# Patient Record
Sex: MALE | Race: BLACK OR AFRICAN AMERICAN | Employment: FULL TIME | URBAN - METROPOLITAN AREA
[De-identification: names, ages, dates, MRNs, and addresses within clinical notes are randomized per-mention and may not be internally consistent; named-entity substitution may affect disease eponyms.]

---

## 2021-08-30 ENCOUNTER — HOSPITAL ENCOUNTER (EMERGENCY)
Age: 56
Discharge: OTHER INSTITUTION | End: 2021-08-31
Attending: EMERGENCY MEDICINE
Payer: COMMERCIAL

## 2021-08-30 ENCOUNTER — APPOINTMENT (OUTPATIENT)
Dept: GENERAL RADIOLOGY | Age: 56
End: 2021-08-30
Attending: EMERGENCY MEDICINE
Payer: COMMERCIAL

## 2021-08-30 DIAGNOSIS — J96.01 ACUTE RESPIRATORY FAILURE WITH HYPOXIA (HCC): Primary | ICD-10-CM

## 2021-08-30 DIAGNOSIS — J12.82 PNEUMONIA DUE TO COVID-19 VIRUS: ICD-10-CM

## 2021-08-30 DIAGNOSIS — U07.1 PNEUMONIA DUE TO COVID-19 VIRUS: ICD-10-CM

## 2021-08-30 LAB
ALBUMIN SERPL-MCNC: 3.3 G/DL (ref 3.5–5)
ALBUMIN/GLOB SERPL: 0.7 {RATIO} (ref 1.1–2.2)
ALP SERPL-CCNC: 51 U/L (ref 45–117)
ALT SERPL-CCNC: 36 U/L (ref 12–78)
ANION GAP SERPL CALC-SCNC: 8 MMOL/L (ref 5–15)
AST SERPL-CCNC: 63 U/L (ref 15–37)
BASOPHILS # BLD: 0 K/UL (ref 0–0.1)
BASOPHILS NFR BLD: 0 % (ref 0–1)
BILIRUB SERPL-MCNC: 0.8 MG/DL (ref 0.2–1)
BUN SERPL-MCNC: 17 MG/DL (ref 6–20)
BUN/CREAT SERPL: 13 (ref 12–20)
CALCIUM SERPL-MCNC: 8.5 MG/DL (ref 8.5–10.1)
CHLORIDE SERPL-SCNC: 94 MMOL/L (ref 97–108)
CO2 SERPL-SCNC: 32 MMOL/L (ref 21–32)
CREAT SERPL-MCNC: 1.26 MG/DL (ref 0.7–1.3)
D DIMER PPP FEU-MCNC: 0.96 MG/L FEU (ref 0–0.65)
DIFFERENTIAL METHOD BLD: ABNORMAL
EOSINOPHIL # BLD: 0 K/UL (ref 0–0.4)
EOSINOPHIL NFR BLD: 0 % (ref 0–7)
ERYTHROCYTE [DISTWIDTH] IN BLOOD BY AUTOMATED COUNT: 11.8 % (ref 11.5–14.5)
FLUAV RNA SPEC QL NAA+PROBE: NOT DETECTED
FLUBV RNA SPEC QL NAA+PROBE: NOT DETECTED
GLOBULIN SER CALC-MCNC: 4.6 G/DL (ref 2–4)
GLUCOSE SERPL-MCNC: 104 MG/DL (ref 65–100)
HCT VFR BLD AUTO: 46.4 % (ref 36.6–50.3)
HGB BLD-MCNC: 15.5 G/DL (ref 12.1–17)
IMM GRANULOCYTES # BLD AUTO: 0 K/UL (ref 0–0.04)
IMM GRANULOCYTES NFR BLD AUTO: 1 % (ref 0–0.5)
INR PPP: 1 (ref 0.9–1.1)
LACTATE SERPL-SCNC: 1.2 MMOL/L (ref 0.4–2)
LDH SERPL L TO P-CCNC: 384 U/L (ref 85–241)
LYMPHOCYTES # BLD: 0.8 K/UL (ref 0.8–3.5)
LYMPHOCYTES NFR BLD: 17 % (ref 12–49)
MCH RBC QN AUTO: 28.5 PG (ref 26–34)
MCHC RBC AUTO-ENTMCNC: 33.4 G/DL (ref 30–36.5)
MCV RBC AUTO: 85.5 FL (ref 80–99)
MONOCYTES # BLD: 0.2 K/UL (ref 0–1)
MONOCYTES NFR BLD: 5 % (ref 5–13)
NEUTS SEG # BLD: 3.7 K/UL (ref 1.8–8)
NEUTS SEG NFR BLD: 77 % (ref 32–75)
NRBC # BLD: 0 K/UL (ref 0–0.01)
NRBC BLD-RTO: 0 PER 100 WBC
PLATELET # BLD AUTO: 190 K/UL (ref 150–400)
PMV BLD AUTO: 9.8 FL (ref 8.9–12.9)
POTASSIUM SERPL-SCNC: 4 MMOL/L (ref 3.5–5.1)
PROT SERPL-MCNC: 7.9 G/DL (ref 6.4–8.2)
PROTHROMBIN TIME: 10.2 SEC (ref 9–11.1)
RBC # BLD AUTO: 5.43 M/UL (ref 4.1–5.7)
RBC MORPH BLD: ABNORMAL
SARS-COV-2, COV2: DETECTED
SODIUM SERPL-SCNC: 134 MMOL/L (ref 136–145)
WBC # BLD AUTO: 4.7 K/UL (ref 4.1–11.1)

## 2021-08-30 PROCEDURE — 74011000250 HC RX REV CODE- 250: Performed by: EMERGENCY MEDICINE

## 2021-08-30 PROCEDURE — 82728 ASSAY OF FERRITIN: CPT

## 2021-08-30 PROCEDURE — 84145 PROCALCITONIN (PCT): CPT

## 2021-08-30 PROCEDURE — 85610 PROTHROMBIN TIME: CPT

## 2021-08-30 PROCEDURE — 86140 C-REACTIVE PROTEIN: CPT

## 2021-08-30 PROCEDURE — 74011250636 HC RX REV CODE- 250/636: Performed by: EMERGENCY MEDICINE

## 2021-08-30 PROCEDURE — 96375 TX/PRO/DX INJ NEW DRUG ADDON: CPT

## 2021-08-30 PROCEDURE — 87040 BLOOD CULTURE FOR BACTERIA: CPT

## 2021-08-30 PROCEDURE — 96366 THER/PROPH/DIAG IV INF ADDON: CPT

## 2021-08-30 PROCEDURE — 36415 COLL VENOUS BLD VENIPUNCTURE: CPT

## 2021-08-30 PROCEDURE — 87636 SARSCOV2 & INF A&B AMP PRB: CPT

## 2021-08-30 PROCEDURE — 74011250637 HC RX REV CODE- 250/637: Performed by: EMERGENCY MEDICINE

## 2021-08-30 PROCEDURE — 96361 HYDRATE IV INFUSION ADD-ON: CPT

## 2021-08-30 PROCEDURE — 83605 ASSAY OF LACTIC ACID: CPT

## 2021-08-30 PROCEDURE — 80053 COMPREHEN METABOLIC PANEL: CPT

## 2021-08-30 PROCEDURE — 99285 EMERGENCY DEPT VISIT HI MDM: CPT

## 2021-08-30 PROCEDURE — 74011000258 HC RX REV CODE- 258: Performed by: EMERGENCY MEDICINE

## 2021-08-30 PROCEDURE — 96365 THER/PROPH/DIAG IV INF INIT: CPT

## 2021-08-30 PROCEDURE — 83615 LACTATE (LD) (LDH) ENZYME: CPT

## 2021-08-30 PROCEDURE — 85025 COMPLETE CBC W/AUTO DIFF WBC: CPT

## 2021-08-30 PROCEDURE — 96374 THER/PROPH/DIAG INJ IV PUSH: CPT

## 2021-08-30 PROCEDURE — 85379 FIBRIN DEGRADATION QUANT: CPT

## 2021-08-30 PROCEDURE — 93005 ELECTROCARDIOGRAM TRACING: CPT

## 2021-08-30 PROCEDURE — 71045 X-RAY EXAM CHEST 1 VIEW: CPT

## 2021-08-30 PROCEDURE — 85384 FIBRINOGEN ACTIVITY: CPT

## 2021-08-30 RX ORDER — SODIUM CHLORIDE 0.9 % (FLUSH) 0.9 %
5-10 SYRINGE (ML) INJECTION AS NEEDED
Status: DISCONTINUED | OUTPATIENT
Start: 2021-08-30 | End: 2021-08-31 | Stop reason: HOSPADM

## 2021-08-30 RX ORDER — ACETAMINOPHEN 500 MG
1000 TABLET ORAL ONCE
Status: COMPLETED | OUTPATIENT
Start: 2021-08-30 | End: 2021-08-30

## 2021-08-30 RX ORDER — KETOROLAC TROMETHAMINE 30 MG/ML
30 INJECTION, SOLUTION INTRAMUSCULAR; INTRAVENOUS
Status: COMPLETED | OUTPATIENT
Start: 2021-08-30 | End: 2021-08-30

## 2021-08-30 RX ADMIN — SODIUM CHLORIDE 1000 ML: 900 INJECTION, SOLUTION INTRAVENOUS at 20:35

## 2021-08-30 RX ADMIN — ACETAMINOPHEN 1000 MG: 500 TABLET ORAL at 20:38

## 2021-08-30 RX ADMIN — SODIUM CHLORIDE 52 ML: 9 INJECTION, SOLUTION INTRAVENOUS at 20:22

## 2021-08-30 RX ADMIN — KETOROLAC TROMETHAMINE 30 MG: 30 INJECTION, SOLUTION INTRAMUSCULAR; INTRAVENOUS at 20:38

## 2021-08-30 RX ADMIN — AZITHROMYCIN 500 MG: 500 INJECTION, POWDER, LYOPHILIZED, FOR SOLUTION INTRAVENOUS at 21:05

## 2021-08-30 RX ADMIN — CEFTRIAXONE SODIUM 1 G: 1 INJECTION, POWDER, FOR SOLUTION INTRAMUSCULAR; INTRAVENOUS at 21:12

## 2021-08-30 RX ADMIN — SODIUM CHLORIDE 1000 ML: 9 INJECTION, SOLUTION INTRAVENOUS at 20:23

## 2021-08-30 NOTE — ED TRIAGE NOTES
Pt arrives reporting COVID-like symptoms x10 days including SOB, cough, fever/chills, fatigue. He is 82% on room air in triage with labored respirations. Placed on 6L via NC and O2 sat increased to 93% on 6L. Denies known exposure.

## 2021-08-31 ENCOUNTER — HOSPITAL ENCOUNTER (INPATIENT)
Age: 56
LOS: 10 days | Discharge: HOME | DRG: 177 | End: 2021-09-10
Attending: EMERGENCY MEDICINE | Admitting: INTERNAL MEDICINE
Payer: COMMERCIAL

## 2021-08-31 VITALS
OXYGEN SATURATION: 90 % | WEIGHT: 200 LBS | HEART RATE: 100 BPM | HEIGHT: 68 IN | BODY MASS INDEX: 30.31 KG/M2 | SYSTOLIC BLOOD PRESSURE: 146 MMHG | RESPIRATION RATE: 41 BRPM | TEMPERATURE: 100.4 F | DIASTOLIC BLOOD PRESSURE: 83 MMHG

## 2021-08-31 DIAGNOSIS — J12.82 PNEUMONIA DUE TO COVID-19 VIRUS: ICD-10-CM

## 2021-08-31 DIAGNOSIS — J96.01 ACUTE RESPIRATORY FAILURE WITH HYPOXIA (HCC): Primary | ICD-10-CM

## 2021-08-31 DIAGNOSIS — U07.1 PNEUMONIA DUE TO COVID-19 VIRUS: ICD-10-CM

## 2021-08-31 LAB
APPEARANCE UR: CLEAR
ARTERIAL PATENCY WRIST A: YES
ATRIAL RATE: 100 BPM
BASE EXCESS BLDA CALC-SCNC: 3.4 MMOL/L
BDY SITE: ABNORMAL
BILIRUB UR QL: NEGATIVE
CALCULATED P AXIS, ECG09: 57 DEGREES
CALCULATED R AXIS, ECG10: 69 DEGREES
CALCULATED T AXIS, ECG11: 29 DEGREES
COLOR UR: NORMAL
CRP SERPL-MCNC: 4.99 MG/DL (ref 0–0.6)
DIAGNOSIS, 93000: NORMAL
FERRITIN SERPL-MCNC: 707 NG/ML (ref 26–388)
FIBRINOGEN PPP-MCNC: 400 MG/DL (ref 200–475)
FIO2 ON VENT: 100 %
GLUCOSE UR STRIP.AUTO-MCNC: NEGATIVE MG/DL
HCO3 BLDA-SCNC: 27 MMOL/L (ref 22–26)
HGB UR QL STRIP: NEGATIVE
KETONES UR QL STRIP.AUTO: NEGATIVE MG/DL
LEUKOCYTE ESTERASE UR QL STRIP.AUTO: NEGATIVE
NITRITE UR QL STRIP.AUTO: NEGATIVE
P-R INTERVAL, ECG05: 152 MS
PCO2 BLDA: 39 MMHG (ref 35–45)
PH BLDA: 7.46 [PH] (ref 7.35–7.45)
PH UR STRIP: 6 [PH] (ref 5–8)
PO2 BLDA: 77 MMHG (ref 80–100)
PROCALCITONIN SERPL-MCNC: 0.14 NG/ML
PROT UR STRIP-MCNC: NEGATIVE MG/DL
Q-T INTERVAL, ECG07: 348 MS
QRS DURATION, ECG06: 76 MS
QTC CALCULATION (BEZET), ECG08: 448 MS
SAO2 % BLD: 96 % (ref 92–97)
SAO2% DEVICE SAO2% SENSOR NAME: ABNORMAL
SP GR UR REFRACTOMETRY: 1.01 (ref 1–1.03)
SPECIMEN SITE: ABNORMAL
UROBILINOGEN UR QL STRIP.AUTO: 1 EU/DL (ref 0.2–1)
VENTRICULAR RATE, ECG03: 100 BPM

## 2021-08-31 PROCEDURE — 65660000000 HC RM CCU STEPDOWN

## 2021-08-31 PROCEDURE — 74011250637 HC RX REV CODE- 250/637: Performed by: EMERGENCY MEDICINE

## 2021-08-31 PROCEDURE — 99285 EMERGENCY DEPT VISIT HI MDM: CPT

## 2021-08-31 PROCEDURE — 74011250636 HC RX REV CODE- 250/636: Performed by: EMERGENCY MEDICINE

## 2021-08-31 PROCEDURE — 77010033678 HC OXYGEN DAILY

## 2021-08-31 PROCEDURE — 82803 BLOOD GASES ANY COMBINATION: CPT

## 2021-08-31 PROCEDURE — 36600 WITHDRAWAL OF ARTERIAL BLOOD: CPT

## 2021-08-31 PROCEDURE — 94761 N-INVAS EAR/PLS OXIMETRY MLT: CPT

## 2021-08-31 PROCEDURE — 81003 URINALYSIS AUTO W/O SCOPE: CPT

## 2021-08-31 RX ORDER — ACETAMINOPHEN 325 MG/1
650 TABLET ORAL
Status: DISCONTINUED | OUTPATIENT
Start: 2021-08-31 | End: 2021-08-31 | Stop reason: HOSPADM

## 2021-08-31 RX ORDER — ONDANSETRON 2 MG/ML
4 INJECTION INTRAMUSCULAR; INTRAVENOUS
Status: DISCONTINUED | OUTPATIENT
Start: 2021-08-31 | End: 2021-09-10 | Stop reason: HOSPADM

## 2021-08-31 RX ORDER — SODIUM CHLORIDE 0.9 % (FLUSH) 0.9 %
5-40 SYRINGE (ML) INJECTION EVERY 8 HOURS
Status: DISCONTINUED | OUTPATIENT
Start: 2021-08-31 | End: 2021-09-10 | Stop reason: HOSPADM

## 2021-08-31 RX ORDER — IBUPROFEN 400 MG/1
800 TABLET ORAL
Status: DISCONTINUED | OUTPATIENT
Start: 2021-08-31 | End: 2021-08-31 | Stop reason: HOSPADM

## 2021-08-31 RX ORDER — ENOXAPARIN SODIUM 100 MG/ML
40 INJECTION SUBCUTANEOUS EVERY 24 HOURS
Status: DISCONTINUED | OUTPATIENT
Start: 2021-09-01 | End: 2021-09-10 | Stop reason: HOSPADM

## 2021-08-31 RX ORDER — ONDANSETRON 2 MG/ML
4 INJECTION INTRAMUSCULAR; INTRAVENOUS
Status: DISCONTINUED | OUTPATIENT
Start: 2021-08-31 | End: 2021-08-31 | Stop reason: HOSPADM

## 2021-08-31 RX ORDER — ACETAMINOPHEN 650 MG/1
650 SUPPOSITORY RECTAL
Status: DISCONTINUED | OUTPATIENT
Start: 2021-08-31 | End: 2021-09-10 | Stop reason: HOSPADM

## 2021-08-31 RX ORDER — BISACODYL 5 MG
5 TABLET, DELAYED RELEASE (ENTERIC COATED) ORAL DAILY PRN
Status: DISCONTINUED | OUTPATIENT
Start: 2021-08-31 | End: 2021-09-10 | Stop reason: HOSPADM

## 2021-08-31 RX ORDER — ASCORBIC ACID 500 MG
500 TABLET ORAL 2 TIMES DAILY
Status: DISCONTINUED | OUTPATIENT
Start: 2021-09-01 | End: 2021-09-01

## 2021-08-31 RX ORDER — DEXAMETHASONE SODIUM PHOSPHATE 4 MG/ML
6 INJECTION, SOLUTION INTRA-ARTICULAR; INTRALESIONAL; INTRAMUSCULAR; INTRAVENOUS; SOFT TISSUE EVERY 24 HOURS
Status: DISCONTINUED | OUTPATIENT
Start: 2021-09-01 | End: 2021-09-10 | Stop reason: HOSPADM

## 2021-08-31 RX ORDER — PROMETHAZINE HYDROCHLORIDE 25 MG/1
12.5 TABLET ORAL
Status: DISCONTINUED | OUTPATIENT
Start: 2021-08-31 | End: 2021-09-10 | Stop reason: HOSPADM

## 2021-08-31 RX ORDER — POLYETHYLENE GLYCOL 3350 17 G/17G
17 POWDER, FOR SOLUTION ORAL DAILY
Status: DISCONTINUED | OUTPATIENT
Start: 2021-09-01 | End: 2021-09-10 | Stop reason: HOSPADM

## 2021-08-31 RX ORDER — ACETAMINOPHEN 325 MG/1
650 TABLET ORAL
Status: DISCONTINUED | OUTPATIENT
Start: 2021-08-31 | End: 2021-09-10 | Stop reason: HOSPADM

## 2021-08-31 RX ORDER — ZINC SULFATE 50(220)MG
1 CAPSULE ORAL EVERY 12 HOURS
Status: DISCONTINUED | OUTPATIENT
Start: 2021-08-31 | End: 2021-09-01

## 2021-08-31 RX ORDER — SODIUM CHLORIDE 0.9 % (FLUSH) 0.9 %
5-40 SYRINGE (ML) INJECTION AS NEEDED
Status: DISCONTINUED | OUTPATIENT
Start: 2021-08-31 | End: 2021-09-10 | Stop reason: HOSPADM

## 2021-08-31 RX ORDER — DEXAMETHASONE SODIUM PHOSPHATE 100 MG/10ML
10 INJECTION INTRAMUSCULAR; INTRAVENOUS
Status: COMPLETED | OUTPATIENT
Start: 2021-08-31 | End: 2021-08-31

## 2021-08-31 RX ADMIN — ACETAMINOPHEN 650 MG: 325 TABLET ORAL at 15:10

## 2021-08-31 RX ADMIN — DEXAMETHASONE SODIUM PHOSPHATE 10 MG: 10 INJECTION INTRAMUSCULAR; INTRAVENOUS at 15:10

## 2021-08-31 NOTE — PROGRESS NOTES
Paged by RN taking care of the patient for an order for NRB. Entirely unclear to me why this order cannot be given by the MD's present at that actual facility given the fact that I have not even seen this patient. Regardless, since the concern is that high for pt's declining respiratory status, a verbal order was given. Have also informed the Transfer Center that despite potential previous acceptance by the [place] MD, given pt's decline in respiratory status as documented by [facility] staff, it would be prudent to transfer the pt over to [facility] ER first.  Again, as the patient is not even at my facility, my recommendation is to have the pt evaluated at bedside by the relevant MD, and further decisions regarding his acute care be made by them.

## 2021-08-31 NOTE — ED NOTES
Attempted to place pt on 6L via NC so pt could eat but pt did not tolerate and O2 stats dropped to 88%. Pt placed back on 8L via Non-rebreather and stats raised to 92%.

## 2021-08-31 NOTE — ED PROVIDER NOTES
EMERGENCY DEPARTMENT HISTORY AND PHYSICAL EXAM      Date: 8/31/2021  Patient Name: Tru Strange II    History of Presenting Illness     Chief Complaint   Patient presents with    Transfer Of Care     Pt arrived via [facility] for transfer of care from [facility]. Pt is COVID + and has PNA as well as on 8L NRB for oxygen. Pt came in to [facility] ED yesterday. Unknown when he was first dx with COVID. History Provided By: Patient and ER note    HPI: Tru Strange II, 48 y.o. male presents to the ED with cc of transfer for COVID-19 and respiratory failure. Patient states that his symptoms started 7 days ago. He has a cough productive of white phlegm and shortness of breath. He did not have a fever at home, but had a temperature of 101.6 at [facility]. His oxygen saturation was 82% on room air. Has not been vaccinated against COVID-19. Denies chest pain, abdominal pain, headache, diarrhea, dysuria. Positive Covid test at [facility]. His x-ray revealed patchy bilateral airspace disease. He was originally accepted for transfer last night, after his saturations improved to the mid 90s on 4 L of oxygen. His respiratory status worsened shortly after midnight. He was requiring 8 L of oxygen on a nonrebreather. It was therefore decided to transfer him to the ER instead. There are no other complaints, changes, or physical findings at this time.     PCP: None    Current Facility-Administered Medications on File Prior to Encounter   Medication Dose Route Frequency Provider Last Rate Last Admin    [COMPLETED] dexamethasone (DECADRON) 10 mg/mL injection 10 mg  10 mg IntraVENous NOW Marcus Caballero MD   10 mg at 08/31/21 1510    [DISCONTINUED] cefTRIAXone (ROCEPHIN) 1 g in sterile water (preservative free) 10 mL IV syringe  1 g IntraVENous Q24H Marcus Caballero MD        [DISCONTINUED] azithromycin (ZITHROMAX) 500 mg in 0.9% sodium chloride 250 mL (VIAL-MATE)  500 mg IntraVENous Q24H Aditya Ruano MD        [DISCONTINUED] acetaminophen (TYLENOL) tablet 650 mg  650 mg Oral Q6H PRN Suleiman Caballero MD   650 mg at 08/31/21 1510    [DISCONTINUED] ondansetron (ZOFRAN) injection 4 mg  4 mg IntraVENous Q4H PRN Suleiman Caballero MD        [DISCONTINUED] ibuprofen (MOTRIN) tablet 800 mg  800 mg Oral Q8H PRN Suleiman Caballero MD        [COMPLETED] sodium chloride 0.9 % bolus infusion 1,000 mL  1,000 mL IntraVENous ONCE Suleiman Caballero MD   IV Completed at 08/30/21 2305    Followed by   [address] [place] [address] Tee [COMPLETED] sodium chloride 0.9 % bolus infusion 1,000 mL  1,000 mL IntraVENous ONCE Suleiman Caballero MD   IV Completed at 08/30/21 2305    Followed by   [address] [place] [address] Tee [COMPLETED] sodium chloride 0.9 % bolus infusion 52 mL  52 mL IntraVENous ONCE Suleiman Caballero MD   IV Completed at 08/30/21 2305    [COMPLETED] ketorolac (TORADOL) injection 30 mg  30 mg IntraVENous NOW Suleiman Caballero MD   30 mg at 08/30/21 2038    [COMPLETED] acetaminophen (TYLENOL) tablet 1,000 mg  1,000 mg Oral ONCE Suleiman Caballero MD   1,000 mg at 08/30/21 2038    [COMPLETED] cefTRIAXone (ROCEPHIN) 1 g in sterile water (preservative free) 10 mL IV syringe  1 g IntraVENous NOW Suleiman Caballero MD   1 g at 08/30/21 2112    [COMPLETED] azithromycin (ZITHROMAX) 500 mg in 0.9% sodium chloride 250 mL (VIAL-MATE)  500 mg IntraVENous NOW Aditya Ruano MD   IV Completed at 08/30/21 2305    [DISCONTINUED] sodium chloride (NS) flush 5-10 mL  5-10 mL IntraVENous PRN Aditya Ruano MD         No current outpatient medications on file prior to encounter. Past History     Past Medical History:  Past Medical History:   Diagnosis Date    No pertinent past medical history        Past Surgical History:  No past surgical history on file. Family History:  No family history on file.     Social History:  Social History     Tobacco Use    Smoking status: Never Smoker    Smokeless tobacco: Never Used   Vaping Use    Vaping Use: Never used   Substance Use Topics    Alcohol use: Never    Drug use: Never       Allergies:  No Known Allergies      Review of Systems   Review of Systems   Constitutional: Positive for fever. HENT: Negative for congestion. Eyes: Negative. Respiratory: Positive for cough and shortness of breath. Cardiovascular: Negative for chest pain. Gastrointestinal: Negative for abdominal pain. Endocrine: Negative for heat intolerance. Genitourinary: Negative. Musculoskeletal: Negative for back pain. Skin: Negative for rash. Allergic/Immunologic: Negative for immunocompromised state. Neurological: Negative for dizziness. Hematological: Does not bruise/bleed easily. Psychiatric/Behavioral: Negative. All other systems reviewed and are negative. Physical Exam   Physical Exam  Vitals and nursing note reviewed. Constitutional:       General: He is not in acute distress. Appearance: He is well-developed. HENT:      Head: Normocephalic and atraumatic. Cardiovascular:      Rate and Rhythm: Normal rate and regular rhythm. Pulses: Normal pulses. Heart sounds: Normal heart sounds. Pulmonary:      Effort: Pulmonary effort is normal.      Breath sounds: Normal breath sounds. Abdominal:      General: Bowel sounds are normal.      Palpations: Abdomen is soft. Musculoskeletal:         General: Normal range of motion. Cervical back: Normal range of motion. Skin:     General: Skin is warm and dry. Neurological:      General: No focal deficit present. Mental Status: He is alert and oriented to person, place, and time.       Coordination: Coordination normal.   Psychiatric:         Mood and Affect: Mood normal.         Behavior: Behavior normal.         Diagnostic Study Results     Labs -     Recent Results (from the past 12 hour(s))   URINALYSIS W/ RFLX MICROSCOPIC    Collection Time: 08/31/21  7:01 AM   Result Value Ref Range    Color YELLOW/STRAW      Appearance CLEAR CLEAR      Specific gravity 1.010 1.003 - 1.030      pH (UA) 6.0 5.0 - 8.0      Protein Negative NEG mg/dL    Glucose Negative NEG mg/dL    Ketone Negative NEG mg/dL    Bilirubin Negative NEG      Blood Negative NEG      Urobilinogen 1.0 0.2 - 1.0 EU/dL    Nitrites Negative NEG      Leukocyte Esterase Negative NEG     BLOOD GAS, ARTERIAL    Collection Time: 08/31/21  5:36 PM   Result Value Ref Range    pH 7.46 (H) 7.35 - 7.45      PCO2 39 35 - 45 mmHg    PO2 77 (L) 80 - 100 mmHg    O2 SAT 96 92 - 97 %    BICARBONATE 27 (H) 22 - 26 mmol/L    BASE EXCESS 3.4 mmol/L    O2 METHOD NONREBREATHER MASK      FIO2 100 %    Sample source ARTERIAL      SITE LEFT RADIAL      HANY'S TEST YES         Radiologic Studies -   No orders to display     CT Results  (Last 48 hours)    None        CXR Results  (Last 48 hours)               08/30/21 2025  XR CHEST PORT Final result    Impression:  1. Patchy bilateral airspace disease           Narrative:  INDICATION:  Sepsis        EXAM: Portable chest 2009 hours without comparisons       FINDINGS: Cardiomediastinal silhouette is within normal limits. The pulmonary   vasculature is normal. There is patchy bilateral airspace disease with a basilar   predominance without pneumothorax or effusion                 Medical Decision Making   I am the first provider for this patient. I reviewed the vital signs, available nursing notes, past medical history, past surgical history, family history and social history. Vital Signs-Reviewed the patient's vital signs.   Patient Vitals for the past 12 hrs:   Temp Pulse Resp BP SpO2   08/31/21 1715  94 28 128/80 94 %   08/31/21 1702     91 %   08/31/21 1701    136/81    08/31/21 1657 99.6 °F (37.6 °C) 93 22 136/81 92 %           Records Reviewed: Nursing Notes, Old Medical Records, Previous electrocardiograms, Previous Radiology Studies and Previous Laboratory Studies    Provider Notes (Medical Decision Making): COVID-19 infection, pneumonia, respiratory failure    ED Course:   Initial assessment performed. The patients presenting problems have been discussed, and they are in agreement with the care plan formulated and outlined with them. I have encouraged them to ask questions as they arise throughout their visit. Consult note:    He has been admitted by the hospitalist, Dr. Karlie Calderon Time:   0    Disposition:  admit    DISCHARGE PLAN:  1. There are no discharge medications for this patient. 2.   Follow-up Information    None       3. Return to ED if worse     Diagnosis     Clinical Impression:   1. Acute respiratory failure with hypoxia ([address]    2. Pneumonia due to COVID-19 virus        Attestations:    Evaristo Whitehead MD    Please note that this dictation was completed with [employer], the computer voice recognition software. Quite often unanticipated grammatical, syntax, homophones, and other interpretive errors are inadvertently transcribed by the computer software. Please disregard these errors. Please excuse any errors that have escaped final proofreading. Thank you.

## 2021-08-31 NOTE — ED NOTES
Pt resting contently in room, in no acute distress, and updated on plan of care. Pt given meal tray.

## 2021-08-31 NOTE — ED NOTES
Bed board/transfer center reports still no beds available at [facility]. Primary RN and charge RN made aware.

## 2021-08-31 NOTE — ED NOTES
Spoke w/ Dr. Anabel Bone hospitalist from [facility] and updated him on pt status.

## 2021-08-31 NOTE — ED NOTES
Pt alert and oriented, in no acute distress, resting in bed with bed in low position and wheels locked, call bell in reach.  Pt on 4L O2 via NC.

## 2021-08-31 NOTE — ED NOTES
Pt alert and oriented, in no acute distress, resting in bed with bed in low position and wheels locked, call bell in reach.  Pt is currently on 6L O2 via NC.

## 2021-08-31 NOTE — ED NOTES
Pt left ED in NAD via EMS stretcher en route to ED [facility] ED. Update on plan of care.

## 2021-08-31 NOTE — ED NOTES
Pt resting contently in room, in no acute distress, and updated on plan of care. [facility] called to report they are delayed for pick-up.

## 2021-08-31 NOTE — ED NOTES
Bedside and Verbal shift change report given to Sara Yanez RN and Malik Sosa RN (oncoming nurse) by Vamsi Andrews RN (offgoing nurse). Report included the following information SBAR.

## 2021-08-31 NOTE — ED NOTES
Pt sitting up on side of bed eating, O2 is off and O2 sat is down at 84%. NC replaced and pt is able to recover to 92% without further intervention.  Encouraged pt to keep NC on. Pt verbalizes understanding

## 2021-08-31 NOTE — ED NOTES
Pt's O2 stats kept dropping to the 89/88%. Pt placed on 8L via Non-rebreather and O2 raised to 94%. ED [facility] hospitalist paged to inform about change.

## 2021-08-31 NOTE — ED NOTES
Bedside and Verbal shift change report given to Reba Nelson 87 (oncoming nurse) by Edith Alvarado RN (offgoing nurse). Report included the following information SBAR, ED Summary, Intake/Output, MAR and Recent Results.

## 2021-08-31 NOTE — ED NOTES
TRANSFER - OUT REPORT:    Verbal report given to Zee Cash RN (name) on Yadira Kidd II  being transferred to [facility] ED (unit) for routine progression of care       Report consisted of patients Situation, Background, Assessment and   Recommendations(SBAR). Information from the following report(s) SBAR, Kardex, ED Summary, Procedure Summary, MAR and Recent Results was reviewed with the receiving nurse. Lines:   Peripheral IV 08/30/21 Distal;Right Basilic (Active)       Peripheral IV 08/30/21 Distal;Left (Active)        Opportunity for questions and clarification was provided.       Patient transported with:   O2 @ 8 liters via Non-rebreather  Cardiac monitor  EMS

## 2021-08-31 NOTE — ED NOTES
Emergency Department Nursing Plan of Care       The Nursing Plan of Care is developed from the Nursing assessment and Emergency Department Attending provider initial evaluation. The plan of care may be reviewed in the ED Provider note.     The Plan of Care was developed with the following considerations:   Patient / Family readiness to learn indicated by:verbalized understanding  Persons(s) to be included in education: patient  Barriers to Learning/Limitations:No    Signed     Kota Mann    8/30/2021   10:58 PM

## 2021-08-31 NOTE — ED NOTES
Bedside and Verbal shift change report given to Sean Tomas RN (oncoming nurse) by Shivam Ferrara RN (offgoing nurse). Report included the following information SBAR.

## 2021-08-31 NOTE — ED NOTES
Called respiratory at this time and they advised they are coming down to perform the ABG on pt as well as evaluate their respiratory status

## 2021-08-31 NOTE — ED PROVIDER NOTES
EMERGENCY DEPARTMENT HISTORY AND PHYSICAL EXAM      Date: 8/30/2021  Patient Name: Paco Vazquez II    History of Presenting Illness     Chief Complaint   Patient presents with    Concern For OTYTO-84 (Coronavirus)       History Provided By: Patient    HPI: Jazmine Everett, 48 y.o. male with PMHx significant for nothing who presents with a chief complaint of shortness of breath and cough ongoing for the last 10 days. No associated fever, nausea, vomiting, diarrhea. No known sick contacts. States his wife made him come to the hospital today as the symptoms were not getting any better. No medications prior to arrival.  Not vaccinated against Covid. PCP: None    There are no other complaints, changes, or physical findings at this time. Current Facility-Administered Medications   Medication Dose Route Frequency Provider Last Rate Last Admin    sodium chloride (NS) flush 5-10 mL  5-10 mL IntraVENous PRN Aditya Ruano MD         Past History     Past Medical History:  History reviewed. No pertinent past medical history. Past Surgical History:  History reviewed. No pertinent surgical history. Family History:  History reviewed. No pertinent family history. Social History:  Social History     Tobacco Use    Smoking status: Never Smoker    Smokeless tobacco: Never Used   Vaping Use    Vaping Use: Never used   Substance Use Topics    Alcohol use: Never    Drug use: Never     Allergies:  No Known Allergies  Review of Systems   Review of Systems   Constitutional: Negative for chills and fever. HENT: Negative for congestion, rhinorrhea and sore throat. Respiratory: Positive for cough and shortness of breath. Cardiovascular: Negative for chest pain. Gastrointestinal: Negative for abdominal pain, nausea and vomiting. Genitourinary: Negative for dysuria and urgency. Skin: Negative for rash. Neurological: Negative for dizziness, light-headedness and headaches.    All other systems reviewed and are negative. Physical Exam   Physical Exam  Vitals and nursing note reviewed. Constitutional:       General: He is in acute distress. Appearance: He is well-developed. He is ill-appearing. HENT:      Head: Normocephalic and atraumatic. Eyes:      Conjunctiva/sclera: Conjunctivae normal.      Pupils: Pupils are equal, round, and reactive to light. Cardiovascular:      Rate and Rhythm: Regular rhythm. Tachycardia present. Pulmonary:      Effort: Tachypnea and respiratory distress present. Breath sounds: Normal breath sounds. No stridor. Abdominal:      General: There is no distension. Palpations: Abdomen is soft. Tenderness: There is no abdominal tenderness. Musculoskeletal:         General: Normal range of motion. Cervical back: Normal range of motion. Skin:     General: Skin is warm and dry. Neurological:      Mental Status: He is alert and oriented to person, place, and time. Diagnostic Study Results   Labs -     Recent Results (from the past 12 hour(s))   LACTIC ACID    Collection Time: 08/30/21  8:16 PM   Result Value Ref Range    Lactic acid 1.2 0.4 - 2.0 MMOL/L   METABOLIC PANEL, COMPREHENSIVE    Collection Time: 08/30/21  8:16 PM   Result Value Ref Range    Sodium 134 (L) 136 - 145 mmol/L    Potassium 4.0 3.5 - 5.1 mmol/L    Chloride 94 (L) 97 - 108 mmol/L    CO2 32 21 - 32 mmol/L    Anion gap 8 5 - 15 mmol/L    Glucose 104 (H) 65 - 100 mg/dL    BUN 17 6 - 20 MG/DL    Creatinine 1.26 0.70 - 1.30 MG/DL    BUN/Creatinine ratio 13 12 - 20      GFR est AA >60 >60 ml/min/1.73m2    GFR est non-AA 60 (L) >60 ml/min/1.73m2    Calcium 8.5 8.5 - 10.1 MG/DL    Bilirubin, total 0.8 0.2 - 1.0 MG/DL    ALT (SGPT) 36 12 - 78 U/L    AST (SGOT) 63 (H) 15 - 37 U/L    Alk.  phosphatase 51 45 - 117 U/L    Protein, total 7.9 6.4 - 8.2 g/dL    Albumin 3.3 (L) 3.5 - 5.0 g/dL    Globulin 4.6 (H) 2.0 - 4.0 g/dL    A-G Ratio 0.7 (L) 1.1 - 2.2     CBC WITH AUTOMATED DIFF Collection Time: 08/30/21  8:16 PM   Result Value Ref Range    WBC 4.7 4.1 - 11.1 K/uL    RBC 5.43 4.10 - 5.70 M/uL    HGB 15.5 12.1 - 17.0 g/dL    HCT 46.4 36.6 - 50.3 %    MCV 85.5 80.0 - 99.0 FL    MCH 28.5 26.0 - 34.0 PG    MCHC 33.4 30.0 - 36.5 g/dL    RDW 11.8 11.5 - 14.5 %    PLATELET 925 254 - 934 K/uL    MPV 9.8 8.9 - 12.9 FL    NRBC 0.0 0  WBC    ABSOLUTE NRBC 0.00 0.00 - 0.01 K/uL    NEUTROPHILS 77 (H) 32 - 75 %    LYMPHOCYTES 17 12 - 49 %    MONOCYTES 5 5 - 13 %    EOSINOPHILS 0 0 - 7 %    BASOPHILS 0 0 - 1 %    IMMATURE GRANULOCYTES 1 (H) 0.0 - 0.5 %    ABS. NEUTROPHILS 3.7 1.8 - 8.0 K/UL    ABS. LYMPHOCYTES 0.8 0.8 - 3.5 K/UL    ABS. MONOCYTES 0.2 0.0 - 1.0 K/UL    ABS. EOSINOPHILS 0.0 0.0 - 0.4 K/UL    ABS. BASOPHILS 0.0 0.0 - 0.1 K/UL    ABS. IMM. GRANS. 0.0 0.00 - 0.04 K/UL    DF SMEAR SCANNED      RBC COMMENTS NORMOCYTIC, NORMOCHROMIC     COVID-19 WITH INFLUENZA A/B    Collection Time: 08/30/21  8:16 PM   Result Value Ref Range    SARS-CoV-2 Detected (AA) NOTD      Influenza A by PCR Not detected      Influenza B by PCR Not detected     EKG, 12 LEAD, INITIAL    Collection Time: 08/30/21  8:46 PM   Result Value Ref Range    Ventricular Rate 100 BPM    Atrial Rate 100 BPM    P-R Interval 152 ms    QRS Duration 76 ms    Q-T Interval 348 ms    QTC Calculation (Bezet) 448 ms    Calculated P Axis 57 degrees    Calculated R Axis 69 degrees    Calculated T Axis 29 degrees    Diagnosis       Normal sinus rhythm  Normal ECG  No previous ECGs available     PROTHROMBIN TIME + INR    Collection Time: 08/30/21  8:47 PM   Result Value Ref Range    INR 1.0 0.9 - 1.1      Prothrombin time 10.2 9.0 - 11.1 sec   LD    Collection Time: 08/30/21  8:47 PM   Result Value Ref Range     (H) 85 - 241 U/L   D DIMER    Collection Time: 08/30/21  8:47 PM   Result Value Ref Range    D-dimer 0.96 (H) 0.00 - 0.65 mg/L FEU       Radiologic Studies -   XR CHEST PORT   Final Result   1.  Patchy bilateral airspace disease           XR CHEST PORT    Result Date: 8/30/2021  1. Patchy bilateral airspace disease     Medical Decision Making   I am the first provider for this patient. I reviewed the vital signs, available nursing notes, past medical history, past surgical history, family history and social history. Vital Signs-Reviewed the patient's vital signs. Patient Vitals for the past 12 hrs:   Temp Pulse Resp BP SpO2   08/30/21 2246  91 (!) 40 123/80 93 %   08/30/21 2116  99 (!) 32 131/79 94 %   08/30/21 2100  99 (!) 40 135/73 95 %   08/30/21 2032     97 %   08/30/21 1958 (!) 101.6 °F (38.7 °C) (!) 111 (!) 35 (!) 152/85 (!) 82 %         Pulse Oximetry Analysis - 82% on RA, improved to 93% on 4L NC    Cardiac Monitor:   Rate: 110 bpm  Rhythm: Sinus Tachycardia      ED EKG interpretation:  Rhythm: normal sinus rhythm; and regular . Rate (approx.): 100; Axis: normal; P wave: normal; QRS interval: normal ; ST/T wave: normal; Other findings: normal. This EKG was interpreted by JERRY Lu MD,ED Provider. Records Reviewed: Nursing Notes    Provider Notes (Medical Decision Making):   Patient presents with shortness of breath and cough for the last 10 days. On presentation he is hypoxic, tachycardic, tachypneic, and febrile. Differential includes COVID-19, pneumonia. Patient placed on nasal cannula on presentation with improved O2 saturations. Will check basic lab work, lactate, blood cultures, chest x-ray, Covid screening. ED Course:   Initial assessment performed. The patients presenting problems have been discussed, and they are in agreement with the care plan formulated and outlined with them. I have encouraged them to ask questions as they arise throughout their visit. ED Course as of Aug 31 1607   Mon Aug 30, 2021   2149 Covid test returned positive. Patient reevaluated, satting in the mid 90s on 4 L but still breathing in the high 30s to mid 40s.   Given his high respiratory rate I feel he is at risk for decompensation and should be transferred to higher level of care. Patient requests transfer to [address] [place] [address]. [RONNY]   [facility]    Vishal Duran   [phone] Patient accepted by Dr. Yo Ortiz, hospitalist at [place] [address] Aug 31, 2021   1225 Patient's respiratory status worsening. Initially on 4 L of O2, and this morning has been more hypoxic, currently requiring 8 L of nonrebreather. Nikki 35 was notified regarding change in respiratory status, and requested transfer to [facility] ED. Patient was then discussed with Dr. Judah Garner, ED attending at [address] [place] [address], who has accepted the patient for transfer. [JM]   7310 EMS here to  patient    [RONNY]      ED Course User Index  [JM] MD Sandy Kidd MD      ED SEPSIS NOTE:     8:10 PM The patient now meets criteria for: Severe Sepsis    1. Fluid resuscitation with: 30 mL/kg crystalloid bolus using ideal weight because patient's BMI is 30 or greater  2. Due to concern for rapidly advancing infection and deterioration of patient's condition, antibiotics are started STAT and cultures ordered. Procedures:  Procedures    Critical Care:  CRITICAL CARE NOTE :  IMPENDING DETERIORATION -Airway and Respiratory  ASSOCIATED RISK FACTORS - Shock and Hypoxia  MANAGEMENT- Bedside Assessment and Transfer  INTERPRETATION -  Xrays, ECG and Blood Pressure  INTERVENTIONS - hemodynamic mngmt, O2 and transfer  CASE REVIEW - Hospitalist/Intensivist and Nursing  TREATMENT RESPONSE -Improved  PERFORMED BY - Self    NOTES   :    I have spent 60 minutes of critical care time involved in lab review, consultations with specialist, family decision- making, bedside attention and documentation. During this entire length of time I was immediately available to the patient .   Lisa Morales MD      Disposition:  Transfer to [address] [place] [address]        Diagnosis     Clinical Impression: 1. Acute respiratory failure with hypoxia ([facility] [address]    2. Pneumonia due to COVID-19 virus            Please note that this dictation was completed with Link Trigger, the computer voice recognition software. Quite often unanticipated grammatical, syntax, homophones, and other interpretive errors are inadvertently transcribed by the computer software. Please disregard these errors.   Please excuse any errors that have escaped final proofreading

## 2021-08-31 NOTE — ED NOTES
Urine specimen collected. Pt alert and oriented, in no acute distress, resting in bed with bed in low position and wheels locked, call bell in reach.  Pt on 6L O2 via NC.

## 2021-08-31 NOTE — ED NOTES
Verbal shift change report given to [phone] (oncoming nurse) by Jose Boggs RN (offgoing nurse). Report included the following information SBAR, Kardex, ED Summary, Procedure Summary, MAR and Recent Results.

## 2021-09-01 LAB
ALBUMIN SERPL-MCNC: 2.5 G/DL (ref 3.5–5)
ALBUMIN/GLOB SERPL: 0.5 {RATIO} (ref 1.1–2.2)
ALP SERPL-CCNC: 52 U/L (ref 45–117)
ALT SERPL-CCNC: 47 U/L (ref 12–78)
ANION GAP SERPL CALC-SCNC: 3 MMOL/L (ref 5–15)
AST SERPL-CCNC: 70 U/L (ref 15–37)
BASOPHILS # BLD: 0 K/UL (ref 0–0.1)
BASOPHILS NFR BLD: 0 % (ref 0–1)
BILIRUB SERPL-MCNC: 0.6 MG/DL (ref 0.2–1)
BUN SERPL-MCNC: 15 MG/DL (ref 6–20)
BUN/CREAT SERPL: 16 (ref 12–20)
CALCIUM SERPL-MCNC: 8.3 MG/DL (ref 8.5–10.1)
CHLORIDE SERPL-SCNC: 102 MMOL/L (ref 97–108)
CO2 SERPL-SCNC: 30 MMOL/L (ref 21–32)
CREAT SERPL-MCNC: 0.95 MG/DL (ref 0.7–1.3)
CRP SERPL-MCNC: 8.64 MG/DL (ref 0–0.6)
D DIMER PPP FEU-MCNC: 0.86 MG/L FEU (ref 0–0.65)
DIFFERENTIAL METHOD BLD: ABNORMAL
EOSINOPHIL # BLD: 0 K/UL (ref 0–0.4)
EOSINOPHIL NFR BLD: 0 % (ref 0–7)
ERYTHROCYTE [DISTWIDTH] IN BLOOD BY AUTOMATED COUNT: 12.3 % (ref 11.5–14.5)
GLOBULIN SER CALC-MCNC: 4.7 G/DL (ref 2–4)
GLUCOSE SERPL-MCNC: 129 MG/DL (ref 65–100)
HCT VFR BLD AUTO: 45.1 % (ref 36.6–50.3)
HGB BLD-MCNC: 14.7 G/DL (ref 12.1–17)
IMM GRANULOCYTES # BLD AUTO: 0 K/UL (ref 0–0.04)
IMM GRANULOCYTES NFR BLD AUTO: 0 % (ref 0–0.5)
LYMPHOCYTES # BLD: 0.3 K/UL (ref 0.8–3.5)
LYMPHOCYTES NFR BLD: 8 % (ref 12–49)
MCH RBC QN AUTO: 28.5 PG (ref 26–34)
MCHC RBC AUTO-ENTMCNC: 32.6 G/DL (ref 30–36.5)
MCV RBC AUTO: 87.4 FL (ref 80–99)
MONOCYTES # BLD: 0.1 K/UL (ref 0–1)
MONOCYTES NFR BLD: 3 % (ref 5–13)
NEUTS SEG # BLD: 3.9 K/UL (ref 1.8–8)
NEUTS SEG NFR BLD: 89 % (ref 32–75)
NRBC # BLD: 0 K/UL (ref 0–0.01)
NRBC BLD-RTO: 0 PER 100 WBC
PLATELET # BLD AUTO: 220 K/UL (ref 150–400)
PMV BLD AUTO: 9.8 FL (ref 8.9–12.9)
POTASSIUM SERPL-SCNC: 4.5 MMOL/L (ref 3.5–5.1)
PROT SERPL-MCNC: 7.2 G/DL (ref 6.4–8.2)
RBC # BLD AUTO: 5.16 M/UL (ref 4.1–5.7)
RBC MORPH BLD: ABNORMAL
SODIUM SERPL-SCNC: 135 MMOL/L (ref 136–145)
WBC # BLD AUTO: 4.3 K/UL (ref 4.1–11.1)

## 2021-09-01 PROCEDURE — 74011250636 HC RX REV CODE- 250/636: Performed by: INTERNAL MEDICINE

## 2021-09-01 PROCEDURE — 74011250637 HC RX REV CODE- 250/637: Performed by: INTERNAL MEDICINE

## 2021-09-01 PROCEDURE — 97116 GAIT TRAINING THERAPY: CPT

## 2021-09-01 PROCEDURE — 77010033711 HC HIGH FLOW OXYGEN

## 2021-09-01 PROCEDURE — 85379 FIBRIN DEGRADATION QUANT: CPT

## 2021-09-01 PROCEDURE — 97530 THERAPEUTIC ACTIVITIES: CPT

## 2021-09-01 PROCEDURE — 97162 PT EVAL MOD COMPLEX 30 MIN: CPT

## 2021-09-01 PROCEDURE — 74011000258 HC RX REV CODE- 258: Performed by: STUDENT IN AN ORGANIZED HEALTH CARE EDUCATION/TRAINING PROGRAM

## 2021-09-01 PROCEDURE — 36415 COLL VENOUS BLD VENIPUNCTURE: CPT

## 2021-09-01 PROCEDURE — 74011250636 HC RX REV CODE- 250/636: Performed by: STUDENT IN AN ORGANIZED HEALTH CARE EDUCATION/TRAINING PROGRAM

## 2021-09-01 PROCEDURE — 80053 COMPREHEN METABOLIC PANEL: CPT

## 2021-09-01 PROCEDURE — 86140 C-REACTIVE PROTEIN: CPT

## 2021-09-01 PROCEDURE — 65660000000 HC RM CCU STEPDOWN

## 2021-09-01 PROCEDURE — 85025 COMPLETE CBC W/AUTO DIFF WBC: CPT

## 2021-09-01 RX ADMIN — AZITHROMYCIN MONOHYDRATE 500 MG: 500 INJECTION, POWDER, LYOPHILIZED, FOR SOLUTION INTRAVENOUS at 11:19

## 2021-09-01 RX ADMIN — Medication 10 ML: at 14:59

## 2021-09-01 RX ADMIN — ZINC SULFATE 220 MG (50 MG) CAPSULE 1 CAPSULE: CAPSULE at 09:02

## 2021-09-01 RX ADMIN — Medication 10 ML: at 06:00

## 2021-09-01 RX ADMIN — Medication 10 ML: at 21:47

## 2021-09-01 RX ADMIN — OXYCODONE HYDROCHLORIDE AND ACETAMINOPHEN 500 MG: 500 TABLET ORAL at 09:02

## 2021-09-01 RX ADMIN — CEFTRIAXONE 1 G: 1 INJECTION, POWDER, FOR SOLUTION INTRAMUSCULAR; INTRAVENOUS at 11:19

## 2021-09-01 RX ADMIN — ZINC SULFATE 220 MG (50 MG) CAPSULE 1 CAPSULE: CAPSULE at 00:44

## 2021-09-01 RX ADMIN — Medication 10 ML: at 00:45

## 2021-09-01 RX ADMIN — DEXAMETHASONE SODIUM PHOSPHATE 6 MG: 4 INJECTION, SOLUTION INTRAMUSCULAR; INTRAVENOUS at 14:58

## 2021-09-01 NOTE — PROGRESS NOTES
Problem: Mobility Impaired (Adult and Pediatric)  Goal: *Acute Goals and Plan of Care (Insert Text)  Description: FUNCTIONAL STATUS PRIOR TO ADMISSION: Pt lives with wife in one story home. He is fully independent at baseline without device and works in [employer] as . HOME SUPPORT PRIOR TO ADMISSION: The patient lived with wife but did not require assist.    Physical Therapy Goals  Initiated 9/1/2021  1. Patient will move from supine to sit and sit to supine , scoot up and down, and roll side to side in bed with independence within 7 day(s). 2.  Patient will transfer from bed to chair and chair to bed with independence using the least restrictive device within 7 day(s). 3.  Patient will perform sit to stand with independence within 7 day(s). 4.  Patient will ambulate with independence for 150 feet with the least restrictive device within 7 day(s). 5.  Patient will ascend/descend 4 stairs with handrail(s) with independence within 7 day(s). (unless unable due to precautions)      Outcome: Not Met   PHYSICAL THERAPY EVALUATION  Patient: Lorenza Lan II (48 y.o. male)  Date: 9/1/2021  Primary Diagnosis: Pneumonia due to COVID-19 virus [U07.1, J12.82]        Precautions: COVID+      ASSESSMENT  Based on the objective data described below, the patient presents with decreased activity tolerance and general weakness due to PNA COVID+. Pt is overall Supervision to SBA, amb only around end of bed, vitals below. He amb with tentative gait and slowed pace. Pt has overall anxious appearance and requires cues to slow breathing with PLB. He was able to maintain sats at 89% and above with activity except for when using BUE with effort to push self up in bed, desatting to 82% briefly but returning to 91% within 1 min, still on 6L. Pt RR is fairly high from hgh 20s to 40 but when cued for PLB and awareness to breathing rate, he was able to bring the rate down to high teens to mid 20s.  Discussed relaxation techniques and proper breathing. 09/01/21 1023 09/01/21 1029 09/01/21 1031   Vital Signs   Pulse (Heart Rate) 79 79  --    Heart Rate Source Monitor  --   --    Heart Rate (Monitor) 79  --   --    Level of Consciousness Alert (0)  --   --    /76 137/82 (!) 150/92   MAP (Calculated) 95 100 111   BP 1 Location Right upper arm Right upper arm Right upper arm   BP 1 Method Automatic Automatic Automatic   BP Patient Position At rest;Lying Sitting Standing   Resp Rate 25 (!) 39  (able to decr effectively to 24 wth cues) 27   O2 Sat (%) 94 % 92 % 94 %   O2 Device Hi flow nasal cannula Hi flow nasal cannula Hi flow nasal cannula   O2 Flow Rate (L/min) 6 l/min 6 l/min 6 l/min        09/01/21 1038 09/01/21 1042 09/01/21 1043   Vital Signs   Pulse (Heart Rate)  --   --   --    Heart Rate Source  --   --   --    Heart Rate (Monitor)  --   --   --    Level of Consciousness  --   --   --    BP  --   --   --    MAP (Calculated)  --   --   --    BP 1 Location  --   --   --    BP 1 Method  --   --   --    BP Patient Position    (post amb 10' around end of bed)    (scooted up in bed using BUE) At rest  (HOB elevated)   Resp Rate 30 (!) 36 (!) 34   O2 Sat (%) (!) 89 % (!) 82 % 91 %   O2 Device Hi flow nasal cannula Hi flow nasal cannula Hi flow nasal cannula   O2 Flow Rate (L/min) 6 l/min 6 l/min 6 l/min        Current Level of Function Impacting Discharge (mobility/balance): Overall Supervision to SBA, no device; anxious with activity, responds to cueing    Functional Outcome Measure: The patient scored 70/100 on the barthel outcome measure which is indicative of 30% functional activity. Other factors to consider for discharge: previously very active, working, driving; limited activity tolerance at this time     Patient will benefit from skilled therapy intervention to address the above noted impairments.        PLAN :  Recommendations and Planned Interventions: bed mobility training, transfer training, gait training, therapeutic exercises, patient and family training/education, and therapeutic activities      Frequency/Duration: Patient will be followed by physical therapy:  3 times a week to address goals. Recommendation for discharge: (in order for the patient to meet his/her long term goals)  To be determined: pending medical course and progress; may eventually benefit from OPPT pulm rehab but still need to see how he responds in acute setting    This discharge recommendation:  A follow-up discussion with the attending provider and/or case management is planned    IF patient discharges home will need the following DME: likely none         SUBJECTIVE:   Patient stated I was ok.     OBJECTIVE DATA SUMMARY:   HISTORY:    Past Medical History:   Diagnosis Date    No pertinent past medical history    No past surgical history on file. Home Situation  Home Environment: Private residence  # Steps to Enter: 4  Rails to Enter: Yes  One/Two Story Residence: One story  Living Alone: No  Support Systems: Spouse/Significant Other/Partner  Current DME Used/Available at Home: None  Tub or Shower Type: Tub/Shower combination    EXAMINATION/PRESENTATION/DECISION MAKING:   Critical Behavior:  Neurologic State: Alert  Orientation Level: Oriented X4  Cognition: Follows commands     Hearing:   Auditory  Auditory Impairment: None    Range Of Motion:  AROM: Within functional limits                       Strength:    Strength: Generally decreased, functional                    Tone & Sensation:   Tone: Normal              Sensation: Intact               Coordination:  Coordination: Within functional limits       Functional Mobility:  Bed Mobility:  Rolling: Independent  Supine to Sit: Supervision  Sit to Supine: Supervision  Scooting: Supervision  Transfers:  Sit to Stand: Stand-by assistance  Stand to Sit: Stand-by assistance                       Balance:   Sitting: Intact  Standing: Impaired  Standing - Static: Good  Standing - Dynamic : Fair  Ambulation/Gait Training:  Distance (ft): 10 Feet (ft)     Ambulation - Level of Assistance: Stand-by assistance        Gait Abnormalities: Decreased step clearance        Base of Support: Narrowed     Speed/Mercy: Slow  Step Length: Left shortened;Right shortened             Functional Measure:  Barthel Index:    Bathin  Bladder: 10  Bowels: 10  Groomin  Dressing: 10  Feeding: 10  Mobility: 0  Stairs: 0  Toilet Use: 10  Transfer (Bed to Chair and Back): 10  Total: 70/100       The Barthel ADL Index: Guidelines  1. The index should be used as a record of what a patient does, not as a record of what a patient could do. 2. The main aim is to establish degree of independence from any help, physical or verbal, however minor and for whatever reason. 3. The need for supervision renders the patient not independent. 4. A patient's performance should be established using the best available evidence. Asking the patient, friends/relatives and nurses are the usual sources, but direct observation and common sense are also important. However direct testing is not needed. 5. Usually the patient's performance over the preceding 24-48 hours is important, but occasionally longer periods will be relevant. 6. Middle categories imply that the patient supplies over 50 per cent of the effort. 7. Use of aids to be independent is allowed. Ely Marshall., Barthel, D.W. (9848). Functional evaluation: the Barthel Index. [facility] (14)2. JEREL Mayorga, Nayla Conley, Tara Forte., [place], [address] [place] [address] (). Measuring the change indisability after inpatient rehabilitation; comparison of the responsiveness of the Barthel Index and Functional [place] Measure. Journal of Neurology, Neurosurgery, and Psychiatry, 66(4), 289-885. Reyna Gomez, N.J.A, Ambika Young  WNeydaJ.M, & Juan August, M.A. (2004.) Assessment of post-stroke quality of life in cost-effectiveness studies:  The usefulness of the Barthel Index and the EuroQoL-5D. Quality of Life Research, 15, 297-30           Physical Therapy Evaluation Charge Determination   History Examination Presentation Decision-Making   MEDIUM  Complexity : 1-2 comorbidities / personal factors will impact the outcome/ POC  HIGH Complexity : 4+ Standardized tests and measures addressing body structure, function, activity limitation and / or participation in recreation  MEDIUM Complexity : Evolving with changing characteristics  LOW Complexity : FOTO score of       Based on the above components, the patient evaluation is determined to be of the following complexity level: LOW     Pain Rating:  No c/o    Activity Tolerance:   Fair    After treatment patient left in no apparent distress:   Call bell within reach, Side rails x 3, and sitting up in bed    COMMUNICATION/EDUCATION:   The patients plan of care was discussed with: Registered nurse. Fall prevention education was provided and the patient/caregiver indicated understanding., Patient/family have participated as able in goal setting and plan of care. , and Patient/family agree to work toward stated goals and plan of care.     Thank you for this referral.  Marisol Nguyen, PT   Time Calculation: 36 mins

## 2021-09-01 NOTE — ED NOTES
Patient is being transferred to [address], Room # [phone]. Report given to Hyacinth Kanner, RN on [facility] for routine progression of care. Report consisted of the following information SBAR, ED Summary, Intake/Output, MAR and Cardiac Rhythm SR. Patient transferred to receiving unit by: ALEXANDER Arana. Outstanding consults needed: No     Next labs due: Yes    The following personal items will be sent with the patient during transfer to the floor: All valuables:    Cardiac monitoring ordered: Yes    The following CURRENT information was reported to the receiving RN:    Code status: Full Code at time of transfer    Last set of vital signs:  Vital Signs  Level of Consciousness: Alert (0) (09/01/21 0038)  Temp: 98.8 °F (37.1 °C) (09/01/21 0038)  Temp Source: Oral (09/01/21 0038)  Pulse (Heart Rate): 73 (09/01/21 0038)  Cardiac Rhythm: Sinus Rhythm (09/01/21 0038)  Resp Rate: 28 (09/01/21 0038)  BP: (!) 143/69 (09/01/21 0038)  MAP (Monitor): 89 (08/31/21 2345)  MAP (Calculated): 94 (09/01/21 0038)  BP 1 Location: Right upper arm (09/01/21 0038)  BP 1 Method: Automatic (09/01/21 0038)  BP Patient Position: At rest (09/01/21 0038)  MEWS Score: 2 (09/01/21 0038)         Oxygen Therapy  O2 Sat (%): 94 % (09/01/21 0038)  Pulse via Oximetry: 77 beats per minute (08/31/21 2345)  O2 Device: Hi flow nasal cannula (09/01/21 0038)  O2 Flow Rate (L/min): 15 l/min (09/01/21 0038)      Last pain assessment:         Wounds: No     Urinary catheter: voiding  Is there a sears order: No     LDAs:       Peripheral IV 09/01/21 Right Antecubital (Active)       Peripheral IV 09/01/21 Left Antecubital (Active)         Opportunity for questions and clarification was provided.     Jessica Senior RN

## 2021-09-01 NOTE — ADT AUTH CERT NOTES
INPATIENT ADMISSION NOTIF   2021    PT STILL IN HOUSE     UR CONTACT   ALEXUS APODACA   [phone]    [address]     FACILITY NPI : [id number]  FACILITY TAX ID : [id number]     [address]  MRM 2 PROGRESSIVE CARE  [facility]  [address] [address] [phone]            Patient Name :Ana M Santillan II   : 1965 (48 yrs)  MRN : [id number]     Patient Mailing Address [address] [place] [address] [47] , [id number]                                                             .         Insurance Plan Payor: Laurence Blanton / Plan: [address] [place] [address] / Product Type: PPO /      Primary Coverage Subscriber ID : [id number]      Current Patient Class : INPATIENT  Admit Date : 2021     REQUESTED LEVEL OF CARE: INPATIENT [101]                                                           Diagnosis : Pneumonia due to COVID-19 virus                          ICD10 Code : Pneumonia due to COVID-19 virus [U07.1, J12.82]       Admitting and Attending Info:  Admitting Provider : Marcus Vigil MD   NPI: [id number]  Admitting Provider Phone. [phone]  Admitting Provider Address:SAME AS FACILITY         Patient Demographics    Patient Name   Zhane Pedersen Legal Sex   Male    1965 Address   [address] [address] Phone   [phone] [facility] Account    Name Acct ID Class Status Primary Coverage   Zhane Pedersen [id number] INPATIENT [address] OF STATE          Guarantor Account (for Hospital Account [de-identified])    Name Relation to Pt Service Area Active?  Acct Type   Ashwini Guzman II [facility] Yes Personal/Family   Address Phone     [address] [phone](U)            Coverage Information (for Hospital Account [de-identified])    F/O Payor/Plan Subscriber  Subscriber Sex Precert #   SUMAN CONN/SUMAN Holley CROSS OUT OF STATE 67 M    Subscriber Subscriber #   Chemo Laura [id number]   Grp # Group Name       Address Phone   [address]    Policy Number Status Effective Date Benefits Phone   [id number] -  -   Auth/Cert             Diagnosis     Codes Comments   Acute respiratory failure with hypoxia [facility])  ICD-10-CM: J96.01   ICD-9-CM: 518.81           Admission Information    Arrival Date/Time: 2021 2506 Admit Date/Time: 2021 1648 IP Adm. Date/Time: 2021   Admission Type: Emergency Point of Origin: Non-health Care Facility/self Admit Category:  Other   Means of Arrival: Ambulance Primary Service: Medicine Secondary Service: N/A   Transfer Source:  Service Area: [facility] Unit: [address]   Admit Provider: Lucie Pollard MD Attending Provider: Leander Ledesma MD Referring Provider:    Admission Information    Attending Provider Admission Dx Admitted on   Daralyn Gosselin, MD Pneumonia due to COVID-19 virus 21   Service Isolation Code Status   MEDICINE CONTACT, DROPLET, DROPLET, Droplet Plus Full Code   Allergies Advance Care Planning    No Known Allergies Jump to the Activity     Admission Information    Unit/Bed: [address] Service: MEDICINE   Admitting provider: Lucie Pollard MD Phone: [phone]   Attending provider: Daralyn Gosselin, MD Phone: [phone]   PCP: None Phone:    Admission dx:  Patient class: I   Admission type: ER     Patient Demographics    Patient Name   Nicole Clemons   [id number] Legal Sex   Male    1965 Address   [address] [place] [address] Phone   [phone] (Home)   [phone] (Mobile)   H&P Notes     H&P by Lucie Pollard MD at 21 documented on ED to Hosp-Admission (Current) from 2021 in [facility]  Author: Lucie Pollard MD Author Type: Physician Filed: 21   Note Status: Signed Cosign: Cosign Not Required Date of Service: 21   : Rodrigue Call MD (Physician)   Dmitriy Colin All            Hospitalist Admission Note     NAME:            Jennifer Hassan II   :               1965   MRN:               [id number]      Date of admit: 2021     PCP:    None     Assessment/Plan:      Acute respiratory failure with hypoxia POA due to  COVID-19 pneumonia POA causing  Sepsis POA   , RR 40 , temp 101.6 , lactate 1.2  Presents with 10 days of cough and increasing SOB  Unvaccinated  CXR with     O2 sats on RA were 82%, RR 40 , Dyspnea  Rapid COVID-19 test  Admit to tele or stepdown  O2 to maintain sats > 90%  Currently on L NC o2  IV dexamethasone  IV remdesevir if hospital will approve  Vit C and Zn  Check inflammatory markers and procalcitonin  Empiric IV ceftriaxone/aztihro or levofloxacin  Consider tocilizumab or Baricitinib if CRP > 7.5 and worsening oxygenation  Pulmonary consult in AM as requires high flow O2 or BIPAP     Obesity POA Body mass index is 30.41 kg/m².     Given the patient's current clinical presentation, I have a high level of concern for decompensation if discharged from the emergency department.   My assessment of this patient's clinical condition and my plan of care is as noted above.     DVT prophylaxis with lovenox     Code status: full code  NOK: wife     History      CHIEF COMPLAINT: \"I have been coughing and SOB for 10 days\", now hypoxic     HISTORY OF PRESENT ILLNESS:     50-year-old male     Denies significant past medical history  Does not wear home oxygen  Not received the COVID-19 vaccine     Past 10 days \"having nonstop coughing\"  Cough react reactive  Progressively worsening shortness of breath, worse with walking              Not to the point he is short of breath at rest              Even walking short distance makes him very winded  Generalized weakness and fatigue Interferes with his ability to do ADLs and yard work  Positive intermittent diarrhea  Poor p.o. intake  Fevers, febrile to 101.6 at [facility]  No nausea vomiting, sore throat, loss of taste or smell     Went to [facility] ER 2021  Febrile to 101.6  Respiratory rate 40  Chest x-ray with patchy bibasilar airspace disease  Rapid Covid positive  Room air sats 82%, now on 15 L O2  Transferred to [facility] for further management        Past medical history:  Pt denies past medical history        Social History           Tobacco Use    Smoking status: Never Smoker    Smokeless tobacco: Never Used   Substance Use Topics    Alcohol use:  Occasional not daily or regular use         Family history:  No children of own  Father  of old age at age 67  Mother was murdered  2 brothers who are healthy     No Known Allergies      Prior to Admission medications        Vit D  Takes no prescription medications     Review of symptoms:                                      POSITIVE= Bold  Negative = not bold  General:                     fever, chills, sweats, generalized weakness, weight loss                                      loss of appetite   Eyes:                           blurred vision, eye pain, double vision  ENT:                            Coryza, sore throat, trouble swallowing  Respiratory:               cough, sputum, SOB  Cardiology:                chest pain, orthopnea, PND, edema  Gastrointestinal:       abdominal pain , N/V, diarrhea, constipation, melena or BRBPR  Genitourinary:           Urgency, dysuria, hematuria  Muskuloskeletal :      Joint redness, swelling or acute joint pain, myalgias  Hematology:              easy bruising, nose or gum bleeding  Dermatological:         rash, ulceration  Endocrine:                 Polyuria or polydipsia, heat or hold intolerance  Neurological:             Headache, focal motor or sensory changes Speech difficulties, memory loss  Psychological:          depression, agitation        Objective:   VITALS:    Patient Vitals for the past 24 hrs:    Temp Pulse Resp BP SpO2   21 1715  94 28 128/80 94 %   21 1702     91 %   21 1701    136/81    21 1657 99.6 °F (37.6 °C) 93 22 136/81 92 %      Temp (24hrs), Av.8 °F (37.7 °C), Min:99.1 °F (37.3 °C), Max:100.4 °F (38 °C)     O2 Flow Rate (L/min): 8 l/min O2 Device: Hi flow nasal cannula     Wt Readings from Last 12 Encounters:   21 90.7 kg (200 lb)   21 90.7 kg (200 lb)            PHYSICAL EXAM:      I had a face to face encounter and independently examined this patient on 21  as outlined below:     General:          Alert, cooperative in no distress     HEENT:           Normocephalic, atraumatic    PERRL, EOMI  Sclera no icterus                          Nasal mucosa without masses or discharge  Hearing intact to voice                          Oropharynx without erythema or exudate  No thrush  [place] MM  Neck:               No meningismus, trachea midline, no carotid bruits                           Thyroid not enlarged, no nodules or tenderness  Lungs:             Decreased BS bilaterally. No wheezing or rales                          No accessory muscle use or retractions. Heart:              Regular rate and rhythm,  no murmur or gallop. No LE edema  Abdomen:        Soft, non-tender. Not distended. Bowel sounds normal.                           No masses, No Hepatosplenomegaly, No Rebound or guarding  Lymph nodes: No cervical or inguinal MICHAEL  Musculoskeletal:  No Joint swelling, erythema, warmth.  No Cyanosis or clubbing  Skin:                 No rashes                           Not Jaundiced   No nodules or thickening  Neurologic:      Alert and oriented X 3, follows commands                           Cranial nerves 2 to 12 intact                          Symmetric motor strength bilaterally                LAB DATA REVIEWED:    Recent Results         Recent Results (from the past 24 hour(s))   URINALYSIS W/ RFLX MICROSCOPIC     Collection Time: 08/31/21  7:01 AM   Result Value Ref Range     Color YELLOW/STRAW       Appearance CLEAR CLEAR       Specific gravity 1.010 1.003 - 1.030       pH (UA) 6.0 5.0 - 8.0       Protein Negative NEG mg/dL     Glucose Negative NEG mg/dL     Ketone Negative NEG mg/dL     Bilirubin Negative NEG       Blood Negative NEG       Urobilinogen 1.0 0.2 - 1.0 EU/dL     Nitrites Negative NEG       Leukocyte Esterase Negative NEG     BLOOD GAS, ARTERIAL     Collection Time: 08/31/21  5:36 PM   Result Value Ref Range     pH 7.46 (H) 7.35 - 7.45       PCO2 39 35 - 45 mmHg     PO2 77 (L) 80 - 100 mmHg     O2 SAT 96 92 - 97 %     BICARBONATE 27 (H) 22 - 26 mmol/L     BASE EXCESS 3.4 mmol/L     O2 METHOD NONREBREATHER MASK       FIO2 100 %     Sample source ARTERIAL       SITE LEFT RADIAL       HANY'S TEST YES              EKG as read by me shows NSR rate 100, normal axis, no LVH  Normal intervals, no ST-T changes     CXR read by radiology and reviewed by myself results:  Cardiomediastinal silhouette is within normal limits. The pulmonary  vasculature is normal. There is patchy bilateral airspace disease with a basilar  predominance without pneumothorax or effusion  IMPRESSION  1. Patchy bilateral airspace disease     I saw the patient personally, took a history and did a complete physical exam at the bedside. I performed complex decision making in coming up with a diagnostic and treatment plan for the patient. I reviewed the patient's past medical records, current laboratory and radiology results, and actual Xray films/EKG.  I have also discussed this case with the involved ED physician.  [address] [place] [address] discussed with:    Patient, Family, ED Doc     Risk of deterioration:  High     Total Time Coordinating Admission:   65  minutes    Total Critical Care Time:           Johan Clements Hiren Ken MD      Patient Demographics    Patient Name   Mikie Benites   [id number] Legal Sex   Male    1965 Address   [address] [address] Phone   [phone] (Home)   [phone] (Mobile)   CSN:   [id number]   [facility] Date: Admit Time Room Bed   Aug 31, 2021  4:48 PM 2243 [[id number]] 77 [[id number]]   Attending Providers    Provider Pager From To   Wen Barakat MD  21   Dipesh Lemos MD  21   Antolin Bartholomew MD  21    Emergency Contact(s)    Name Relation Home Work Daisy Spouse [phone]   Utilization Reviews       MD Progress Note-21 by Comfort Escobar       Review Entered Review Status   2021 15:21 In Primary      Criteria Review   Hospitalist Progress Note     NAME:            Josh Singh  PIQ:                  MRN:               [id number]         Assessment / Plan:  Acute respiratory failure with hypoxia POA due to  COVID-19 pneumonia POA causing  Sepsis POA   , RR 40 , temp 101.6 , lactate 1.2     Presents with 10 days of cough and increasing SOB  Unvaccinated  O2 sats on RA were 82%, RR 40 , Dyspnea  Admit to tele or stepdown  O2 to maintain sats > 90%  Currently on L NC o2  C/w IV dexamethasone  Didn't qualify for Remdesivir per hospital protocol as her symptoms began 10 days back  Check inflammatory markers and procalcitonin  Started on Emperic Abx  Monitor CRP, if >7.5, and worsening oxygen requirement, would qualify for Tocilizumab  Pulmonary following     Obesity POA Body mass index is 30.41 kg/m².     DVT prophylaxis with lovenox     Code status: full code  NOK: wife     Estimated discharge date:   Barriers: On 6 liters oxygen      Subjective:      Chief Complaint / Reason for Physician Visit  Follow up for covid 19  He is decreased to 6 liters oxygen this AM  Looks anxious     Review of Systems:                               Symptom Y/N Comments   Symptom Y/N Comments    Fever/Chills n     Chest Pain         Poor Appetite       Edema         Cough y     Abdominal Pain         Sputum y     Joint Pain         SOB/VIRK y     Pruritis/Rash         Nausea/vomit       Tolerating PT/OT         Diarrhea       Tolerating Diet         Constipation       Other            Could NOT obtain due to:        Objective:      VITALS:   Last 24hrs VS reviewed since prior progress note.  Most recent are:  Patient Vitals for the past 24 hrs:    Temp Pulse Resp BP SpO2   09/01/21 1200  73      09/01/21 1043   (!) 34  91 %   09/01/21 1042   (!) 36  (!) 82 %   09/01/21 1038   30  (!) 89 %   09/01/21 1031   27 (!) 150/92 94 %   09/01/21 1029  79 (!) 39 137/82 92 %   09/01/21 1023 98.7 °F (37.1 °C) 79 25 134/76 94 %   09/01/21 1021  75 29 134/76 93 %   09/01/21 0746  75      09/01/21 0722 98 °F (36.7 °C) 78 28 125/76 94 %   09/01/21 0400  78      09/01/21 0219 98.2 °F (36.8 °C) 77 30 122/79 94 %   09/01/21 0155  73 27  96 %   09/01/21 0100  78 (!) 31 (!) 105/27 97 %   09/01/21 0038 98.8 °F (37.1 °C) 73 28 (!) 143/69 94 %   09/01/21 0037     94 %   09/01/21 0000  78      08/31/21 2345  78 (!) 38 108/84 94 %   08/31/21 2330  76 27 118/76 95 %   08/31/21 2315  77 24 120/82 94 %   08/31/21 2300  80 29 127/80 95 %   08/31/21 2245  77 26 128/79 95 %   08/31/21 2230  79 (!) 33 115/78 94 %   08/31/21 2215  78 26 118/73 92 %   08/31/21 2200  77 28 119/70 99 %   08/31/21 2145  79 21 122/73 98 %   08/31/21 2130  83 (!) 31 124/78 95 %   08/31/21 2115  92 (!) 35 135/79 95 %   08/31/21 2100  85 29 121/87 90 %   08/31/21 2045  88 29 138/78    08/31/21 2000  78      08/31/21 1715  94 28 128/80 94 %   08/31/21 1702     91 %   08/31/21 1701    136/81    08/31/21 1657 99.6 °F (37.6 °C) 93 22 136/81 92 %         Intake/Output Summary (Last 24 hours) at 9/1/2021 1420  Last data filed at 9/1/2021 1023        Gross per 24 hour   Intake    Output 810 ml   Net -810 ml         I had a face to face encounter and independently examined this patient on 9/1/2021, as outlined below:  PHYSICAL EXAM:  General:          WD, WN. Alert, cooperative, no acute distress    EENT:              EOMI. Anicteric sclerae. MMM  Resp:               CTA bilaterally, no wheezing or rales.  No accessory muscle use  CV:                  Regular  rhythm,  No edema  GI:                   Soft, Non distended, Non tender.  +Bowel sounds  Neurologic:       Alert and oriented X 3, normal speech,   Psych:   Good insight. Not anxious nor agitated  Skin:                No rashes.  No jaundice     Reviewed most current lab test results and cultures  YES  Reviewed most current radiology test results   YES  Review and summation of old records today    NO  Reviewed patient's current orders and MAR    YES  PMH/ reviewed - no change compared to H&P  ________________________________________________________________________  Care Plan discussed with:      Comments   Patient y     Family        RN y     Care Manager       Consultant                           Multidiciplinary team rounds were held today with , nursing, pharmacist and clinical coordinator.  Patient's plan of care was discussed; medications were reviewed and discharge planning was addressed.     ________________________________________________________________________  Total NON critical care TIME: 35   Minutes     Total CRITICAL CARE TIME Spent:   Minutes non procedure based         Comments   >50% of visit spent in counseling and coordination of care       ________________________________________________________________________  Marquis Rina MD      Procedures: see electronic medical records for all procedures/Xrays and details which were not copied into this note but were reviewed prior to creation of Plan.       LABS:  I reviewed today's most current labs and imaging studies.   Pertinent labs include:          Recent Labs     09/01/21 0217 08/30/21 2016   WBC 4.3 4.7   HGB 14.7 15.5   HCT 45.1 46.4    190                Recent Labs     09/01/21 0217 08/30/21 2047 08/30/21 2016   *  --  134*   K 4.5  --  4.0     --  94*   CO2 30  --  32   *  --  104*   BUN 15  --  17   CREA 0.95  --  1.26   CA 8.3*  --  8.5   ALB 2.5*  --  3.3*   TBILI 0.6  --  0.8   ALT 47  --  36   INR  --  1.0  --          Signed: Kevin Shea MD      Viral Illness, Acute - Care Day 2 (9/1/2021) by Nakul Duran RN       Review Entered Review Status   9/1/2021 14:07 Completed      Criteria Review      Care Day: 2 Care Date: 9/1/2021 Level of Care: Step Down    Guideline Day 2    Level Of Care    (X) Floor    9/1/2021 14:07:57 EDT by Jaden Haynes      ip telemetry    Clinical Status    (X) * Hypotension absent    9/1/2021 14:07:57 EDT by Natty Reyes      bp 150/92    (X) * No requirement for mechanical ventilation    9/1/2021 14:07:57 EDT by Natty Reyes      n/a    ( ) * Oxygenation at baseline or improved    9/1/2021 14:07:57 EDT by Natty Reyes      82-91% 6LPM HI FLOW NC    (X) * Mental status at baseline    9/1/2021 14:07:57 EDT by Natty Reyes      alert,oriented    Activity    (X) Advance activity as tolerated    9/1/2021 14:07:57 EDT by Jaden Haynes      activity as tolerated w/ assistance    Routes    (X) * Oral hydration    9/1/2021 14:07:57 EDT by Jaden Rain      regular diet low fat, low chol, high fiber, KATIE    (X) Oral or IV medications    (X) Usual diet    9/1/2021 14:07:57 EDT by Jaden Rain      regular diet low fat, low chol, high fiber, KATIE    Interventions    (X) Possible isolation    9/1/2021 14:07:57 EDT by Natty Reyes      droplet plus isolation    (X) Pulse oximetry    9/1/2021 14:07:57 EDT by Jaden Haynes      spot check    ( ) Possible oxygen    9/1/2021 14:07:57 EDT by Jaden Haynes      resp 39 82-91% 6LPM HI FLOW NC    Medications    (X) Possible antibiotic (eg, for bacterial coinfection or superinfection)    9/1/2021 14:07:57 EDT by Dinora Monson      Zithromax 500mg iv qday  Rocephin 1g iv qday    * Milestone   Additional Notes   9/1/2021   IP TELEMETRY   VS  98 79 150/92 39 82-91% 6LPM HI FLOW NC   LABS     D-dimer: 0.86 (H)   Sodium: 135 (L)   Potassium: 4.5   Chloride: 102   CO2: 30   Anion gap: 3 (L)   Glucose: 129 (H)   BUN: 15   Creatinine: 0.95   BUN/Creatinine ratio: 16   Calcium: 8.3 (L)   GFR est non-AA: >60   GFR est AA: >60   Bilirubin, total: 0.6   Protein, total: 7.2   Albumin: 2.5 (L)   Globulin: 4.7 (H)   A-G Ratio: 0.5 (L)   ALT: 47   AST: 70 (H)   Alk. phosphatase: 52   C-Reactive protein: 8.64 (H)      MEDS   Decadron 6mg iv qday   Vitamin c 500mg po bid   Zinc 50mg po q12      PULMONARY DISEASE NOTE   IMPRESSION:   · Acute hypoxic respiratory failure   · COVID-19 pneumonia, unvaccinated   · COVID associated gastroenteritis       RECOMMENDATIONS:   · O2 - wean as tolerated   · Dexamethasone   · Outside of window for remdesivir per hospital system criteria   · CRP is elevated but would hold off on administration of tocilizumab since his O2 requirements have improved.  If he worsens, certainly should be reconsidered   · DVT prophylaxis       Subjective:       This patient has been seen and evaluated at the request of Dr. Darlene Primrose for [facility]. Patient is a 48 y.o. unvaccinated male admitted overnight with COVID pneumonia. He has had 10 days of coughing and progressive dyspnea.  Some diarrhea as well.  Found to be hypoxic on admission.  Since admission he has been weaned from 15 LPM down to 6 LPM.      Physical Exam:    General: Alert, ill appearing, no distress   Head: Normocephalic, without obvious abnormality, atraumatic. Eyes: Conjunctivae/corneas clear.     Nose: Nares normal. Septum midline.  Mucosa normal.    Throat: Lips, mucosa, and tongue normal.     Neck: Supple, symmetrical, trachea midline    Back:   Symmetric, no curvature. ROM normal.   Lungs:   Distant bilateral breath sounds   Chest wall: No tenderness or deformity. Heart: Regular rate and rhythm    Abdomen:   Soft, non-tender. Bowel sounds normal.    Extremities: Extremities normal, atraumatic, no cyanosis    Skin: Skin color, texture, turgor normal. No rashes or lesions   Lymph nodes: Cervical, supraclavicular nodes normal.   Neurologic: Grossly nonfocal               Viral Illness, Acute - Care Day 1 (8/31/2021) by Joseph Velasquez Entered Review Status   9/1/2021 09:29 Completed      Criteria Review      Care Day: 1 Care Date: 8/31/2021 Level of Care: Step Down    Guideline Day 1    Level Of Care    (X) ICU or floor    9/1/2021 09:29:07 EDT by [facility]      Stepdown    Clinical Status    (X) * Clinical Indications met    9/1/2021 09:29:07 EDT by Carolina Allan old unvaccinated male w/no sig PMH to ED c/o cough x10 days that will not stop. Noted to be hypoxic in ED w/RA sat 82% and req HFNC. Rapid COVID 19+. 90.7kg. HR 80, 115/78    ( ) Possible Fever    9/1/2021 09:29:07 EDT by [facility]      Tmax 99.6    (X) Possible Tachypnea    9/1/2021 09:29:07 EDT by [facility]      RR sustained 24-38    (X) Possible Hypoxemia    9/1/2021 09:29:07 EDT by [facility]      RA sat 82%-req O2 at 6liters inc to 8liters and then HFNC at 15liters with sats 92%    Routes    (X) Liquid or usual diet    9/1/2021 09:29:07 EDT by [facility]      Reg diet-low fat/low chol/high fiber/KATIE    Interventions    (X) Possible isolation    9/1/2021 09:29:07 EDT by [facility]      droplet plus isolation    (X) CBC with differential, chemistries, renal and hepatic function testing, C-reactive protein, coagulation panel    9/1/2021 09:29:07 EDT by [facility]      d dimer 0.96. CRP 4.99. Ferritin 707, . Na 134, gluc 104, alb 3.3, glob 4.6, ast 63. bld cx x2.  CBC w/diff and UA wnl.12 lead EKG: NSR, rate 100    (X) PCR for viral pathogens    9/1/2021 09:29:07 EDT by Ivy Lerner      rapid covid 19 POSITIVE    (X) ABG or oximetry    9/1/2021 09:29:07 EDT by Ivy Lerner      ABG on NRBM at FIO2: 7.46/39/77/27/96.  Cont pulse ox    (X) Possible oxygen    9/1/2021 09:29:07 EDT by Ivy Lerner      HFNC at 15 liters    (X) Possible chest x-ray    9/1/2021 09:29:07 EDT by Gabrielle Walden cxr: patchy bilateral airspace diseaes    * Milestone   Additional Notes   Assessment/Plan:    Acute respiratory failure with hypoxia POA due to   COVID-19 pneumonia POA causing   Sepsis POA   , RR 40 , temp 101.6 , lactate 1.2   Presents with 10 days of cough and increasing SOB   Unvaccinated   CXR with      O2 sats on RA were 82%, RR 40 , Dyspnea   Rapid COVID-19 test   Admit to tele or stepdown   O2 to maintain sats > 90%   Currently on L NC o2   IV dexamethasone   IV remdesevir if hospital will approve   Vit C and Zn   Check inflammatory markers and procalcitonin   Empiric IV ceftriaxone/aztihro or levofloxacin   Consider tocilizumab or Baricitinib if CRP > 7.5 and worsening oxygenation   Pulmonary consult in AM as requires high flow O2 or BIPAP       Obesity POA Body mass index is 30.41 kg/m².       Given the patient's current clinical presentation, I have a high level of concern for decompensation if discharged from the emergency department.    My assessment of this patient's clinical condition and my plan of care is as noted above.       DVT prophylaxis with lovenox      PHYSICAL EXAM:    General:          Alert, cooperative in no distress      HEENT:           Normocephalic, atraumatic    PERRL, EOMI  Sclera no icterus                           Nasal mucosa without masses or discharge  Hearing intact to voice                           Oropharynx without erythema or exudate  No thrush  [place] MM   Neck:               No meningismus, trachea midline, no carotid bruits                            Thyroid not enlarged, no nodules or tenderness   Lungs:             Decreased BS bilaterally. No wheezing or rales                           No accessory muscle use or retractions. Heart:              Regular rate and rhythm,  no murmur or gallop.                           No LE edema   Abdomen:        Soft, non-tender. Not distended.  Bowel sounds normal.                            No masses, No Hepatosplenomegaly, No Rebound or guarding   Lymph nodes: No cervical or inguinal MICHAEL   Musculoskeletal:  No Joint swelling, erythema, warmth. No Cyanosis or clubbing   Skin:                 No rashes                            Not Jaundiced   No nodules or thickening   Neurologic:      Alert and oriented X 3, follows commands                            Cranial nerves 2 to 12 intact                           Symmetric motor strength bilaterally      Viral Illness, Acute - Clinical Indications for Admission to Inpatient Care by Lakhwinder Meier       Review Entered Review Status   9/1/2021 09:07 Completed      Criteria Review      Clinical Indications for Admission to Inpatient Care    Most Recent : Ivy Lerner Most Recent Date: 9/1/2021 09:07:16 EDT    (X) Admission is indicated for  1 or more  of the following  [A] [B] (1) (2) (3) (4) (5) (6) (7)    (8) (9):       (X) Pulmonary manifestation, as indicated by  1 or more  of the following :          (X) Hypoxemia          9/1/2021 09:07:16 EDT by Ivy Lerner            RA sat 82% in ED with labored respirations. Placed on 6liters and then increased to NRBM at 8liters and HFNC at 15 liters          (X) Severe tachypnea (eg, respiratory rate greater than 24 breaths per minute [C]) (13)          9/1/2021 09:07:16 EDT by Ivy Lerner            RR 24-38    Notes:    9/1/2021 09:07:16 EDT by Ivy Lerner    Subject: Additional Clinical Information      * Pt is 55yr old male with no sig PMH that is unvaccinated to ED c/o nonstop coughing.  Rapid covid positive in outside ED prior to transfer and hypoxia with RA sat down to 82% now req HFNC at 15 liters.  Admitted

## 2021-09-01 NOTE — PROGRESS NOTES
Problem: Falls - Risk of  Goal: *Absence of Falls  Description: Document Luisa Almanza Fall Risk and appropriate interventions in the flowsheet.   Outcome: Progressing Towards Goal  Note: Fall Risk Interventions:            Medication Interventions: Bed/chair exit alarm, Patient to call before getting OOB, Teach patient to arise slowly

## 2021-09-01 NOTE — PROGRESS NOTES
0200- TRANSFER - IN REPORT:    Verbal report received from Michelle Ledezma RN(name) on [employer]  being received from ED (unit) for routine progression of care      Report consisted of patients Situation, Background, Assessment and   Recommendations(SBAR). Information from the following report(s) SBAR, Kardex, ED Summary, Intake/Output, MAR, Recent Results, Med Rec Status and Cardiac Rhythm NSR was reviewed with the receiving nurse. Opportunity for questions and clarification was provided. Assessment completed upon patients arrival to unit and care assumed. 0215- Pt arrived to floor via stretcher. Pt ambulated to PCU bed x 1 assist. Pt on 15L HFNC, sats 93%. VSS. No complaints of pain. Labs drawn, will continue to monitor. 0700- End of Shift Note    Bedside shift change report given to Yamini Brito (oncoming nurse) by Maddy Meier RN (offgoing nurse). Report included the following information SBAR, Kardex, ED Summary, Intake/Output, MAR, Recent Results, Med Rec Status and Cardiac Rhythm NSR    Shift worked:  7p-7a     Shift summary and any significant changes:     Pt admit from ED. O2 weaned from 15L HFNC to 10L. No acute concerns at this time. Concerns for physician to address:       Zone phone for oncoming shift:          Activity:  Activity Level:  Up with Assistance  Number times ambulated in hallways past shift: 0  Number of times OOB to chair past shift: 0    Cardiac:   Cardiac Monitoring: Yes      Cardiac Rhythm: Sinus Rhythm    Access:   Current line(s): PIV     Genitourinary:   Urinary status: voiding    Respiratory:   O2 Device: Hi flow nasal cannula  Chronic home O2 use?: NO  Incentive spirometer at bedside: NO     GI:     Current diet:  ADULT DIET Regular; Low Fat/Low Chol/High Fiber/KATIE  Passing flatus: YES  Tolerating current diet: YES       Pain Management:   Patient states pain is manageable on current regimen: YES    Skin:  Antonio Score: 23  Interventions: turn team, speciality bed, increase time out of bed and limit briefs    Patient Safety:  Fall Score:  Total Score: 1  Interventions: bed/chair alarm, assistive device (walker, cane, etc), gripper socks and pt to call before getting OOB       Length of Stay:  Expected LOS: - - -  Actual LOS: [address], RN

## 2021-09-01 NOTE — PROGRESS NOTES
Physical Therapy    Pt seen for eval. Overall Supervision to SBA, amb only around end of bed, vitals below. Pt has overall anxious appearance and requires cues to slow breathing with PLB. Recommendation to be determined pending progress. Full report to follow.     Colleen Dalal, PT       09/01/21 1023 09/01/21 1029 09/01/21 1031   Vital Signs   Pulse (Heart Rate) 79 79  --    Heart Rate Source Monitor  --   --    Heart Rate (Monitor) 79  --   --    Level of Consciousness Alert (0)  --   --    /76 137/82 (!) 150/92   MAP (Calculated) 95 100 111   BP 1 Location Right upper arm Right upper arm Right upper arm   BP 1 Method Automatic Automatic Automatic   BP Patient Position At rest;Lying Sitting Standing   Resp Rate 25 (!) 39  (able to decr effectively to 24 wth cues) 27   O2 Sat (%) 94 % 92 % 94 %   O2 Device Hi flow nasal cannula Hi flow nasal cannula Hi flow nasal cannula   O2 Flow Rate (L/min) 6 l/min 6 l/min 6 l/min      09/01/21 1038 09/01/21 1042 09/01/21 1043   Vital Signs   Pulse (Heart Rate)  --   --   --    Heart Rate Source  --   --   --    Heart Rate (Monitor)  --   --   --    Level of Consciousness  --   --   --    BP  --   --   --    MAP (Calculated)  --   --   --    BP 1 Location  --   --   --    BP 1 Method  --   --   --    BP Patient Position   (post amb 10' around end of bed)   (scooted up in bed using BUE) At rest  (HOB elevated)   Resp Rate 30 (!) 36 (!) 34   O2 Sat (%) (!) 89 % (!) 82 % 91 %   O2 Device Hi flow nasal cannula Hi flow nasal cannula Hi flow nasal cannula   O2 Flow Rate (L/min) 6 l/min 6 l/min 6 l/min

## 2021-09-01 NOTE — CONSULTS
Pulmonary, Critical Care, and Sleep Medicine    Initial Patient Consult    Name: Mary Horvath II MRN: [id number]   : 1965 Hospital: [facility]   Date: 2021        IMPRESSION:   · Acute hypoxic respiratory failure  · COVID-19 pneumonia, unvaccinated  · COVID associated gastroenteritis      RECOMMENDATIONS:   · O2 - wean as tolerated  · Dexamethasone  · Outside of window for remdesivir per hospital system criteria  · CRP is elevated but would hold off on administration of tocilizumab since his O2 requirements have improved. If he worsens, certainly should be reconsidered  · DVT prophylaxis     Subjective: This patient has been seen and evaluated at the request of Dr. Dickson Verde for [facility]. Patient is a 48 y.o. unvaccinated male admitted overnight with COVID pneumonia. He has had 10 days of coughing and progressive dyspnea. Some diarrhea as well. Found to be hypoxic on admission. Since admission he has been weaned from 13 LPM down to 6 LPM.       No past medical history on file. No past surgical history on file. Prior to Admission medications    Not on File     No Known Allergies   Social History     Tobacco Use    Smoking status: Never Smoker    Smokeless tobacco: Never Used   Substance Use Topics    Alcohol use: Never      No family history on file. Current Facility-Administered Medications   Medication Dose Route Frequency    dexamethasone (DECADRON) 4 mg/mL injection 6 mg  6 mg IntraVENous Q24H    sodium chloride (NS) flush 5-40 mL  5-40 mL IntraVENous Q8H    polyethylene glycol (MIRALAX) packet 17 g  17 g Oral DAILY    enoxaparin (LOVENOX) injection 40 mg  40 mg SubCUTAneous Q24H    ascorbic acid (vitamin C) (VITAMIN C) tablet 500 mg  500 mg Oral BID    zinc sulfate (ZINCATE) 50 mg zinc (220 mg) capsule 1 Capsule  1 Capsule Oral Q12H       Review of Systems:  A comprehensive review of systems was negative except for that written in the HPI.     Objective: Vital Signs:    Visit Vitals  /76 (BP 1 Location: Right upper arm, BP Patient Position: At rest;Lying)   Pulse 75   Temp 98 °F (36.7 °C)   Resp 28   Ht 5' 8\" (1.727 m)   Wt 90.4 kg (199 lb 4.7 oz)   SpO2 94%   BMI 30.30 kg/m²       O2 Device: Hi flow nasal cannula   O2 Flow Rate (L/min): 6 l/min   Temp (24hrs), Av.2 °F (37.3 °C), Min:98 °F (36.7 °C), Max:100.4 °F (38 °C)       Intake/Output:   Last shift:      No intake/output data recorded. Last 3 shifts:  1901 -  0700  In: -   Out: 400 [Urine:400]    Intake/Output Summary (Last 24 hours) at 2021 1008  Last data filed at 2021 0219  Gross per 24 hour   Intake    Output 400 ml   Net -400 ml      Physical Exam:   General:  Alert, ill appearing, no distress   Head:  Normocephalic, without obvious abnormality, atraumatic. Eyes:  Conjunctivae/corneas clear. Nose: Nares normal. Septum midline. Mucosa normal.    Throat: Lips, mucosa, and tongue normal.     Neck: Supple, symmetrical, trachea midline    Back:   Symmetric, no curvature. ROM normal.   Lungs:   Distant bilateral breath sounds   Chest wall:  No tenderness or deformity. Heart:  Regular rate and rhythm    Abdomen:   Soft, non-tender.  Bowel sounds normal.    Extremities: Extremities normal, atraumatic, no cyanosis    Skin: Skin color, texture, turgor normal. No rashes or lesions   Lymph nodes: Cervical, supraclavicular nodes normal.   Neurologic: Grossly nonfocal     Data review:     Recent Results (from the past 24 hour(s))   BLOOD GAS, ARTERIAL    Collection Time: 21  5:36 PM   Result Value Ref Range    pH 7.46 (H) 7.35 - 7.45      PCO2 39 35 - 45 mmHg    PO2 77 (L) 80 - 100 mmHg    O2 SAT 96 92 - 97 %    BICARBONATE 27 (H) 22 - 26 mmol/L    BASE EXCESS 3.4 mmol/L    O2 METHOD NONREBREATHER MASK      FIO2 100 %    Sample source ARTERIAL      SITE LEFT RADIAL      HANY'S TEST YES     METABOLIC PANEL, COMPREHENSIVE    Collection Time: 21  2:17 AM   Result Value Ref Range    Sodium 135 (L) 136 - 145 mmol/L    Potassium 4.5 3.5 - 5.1 mmol/L    Chloride 102 97 - 108 mmol/L    CO2 30 21 - 32 mmol/L    Anion gap 3 (L) 5 - 15 mmol/L    Glucose 129 (H) 65 - 100 mg/dL    BUN 15 6 - 20 MG/DL    Creatinine 0.95 0.70 - 1.30 MG/DL    BUN/Creatinine ratio 16 12 - 20      GFR est AA >60 >60 ml/min/1.73m2    GFR est non-AA >60 >60 ml/min/1.73m2    Calcium 8.3 (L) 8.5 - 10.1 MG/DL    Bilirubin, total 0.6 0.2 - 1.0 MG/DL    ALT (SGPT) 47 12 - 78 U/L    AST (SGOT) 70 (H) 15 - 37 U/L    Alk. phosphatase 52 45 - 117 U/L    Protein, total 7.2 6.4 - 8.2 g/dL    Albumin 2.5 (L) 3.5 - 5.0 g/dL    Globulin 4.7 (H) 2.0 - 4.0 g/dL    A-G Ratio 0.5 (L) 1.1 - 2.2     CBC WITH AUTOMATED DIFF    Collection Time: 09/01/21  2:17 AM   Result Value Ref Range    WBC 4.3 4.1 - 11.1 K/uL    RBC 5.16 4.10 - 5.70 M/uL    HGB 14.7 12.1 - 17.0 g/dL    HCT 45.1 36.6 - 50.3 %    MCV 87.4 80.0 - 99.0 FL    MCH 28.5 26.0 - 34.0 PG    MCHC 32.6 30.0 - 36.5 g/dL    RDW 12.3 11.5 - 14.5 %    PLATELET 451 953 - 052 K/uL    MPV 9.8 8.9 - 12.9 FL    NRBC 0.0 0  WBC    ABSOLUTE NRBC 0.00 0.00 - 0.01 K/uL    NEUTROPHILS 89 (H) 32 - 75 %    LYMPHOCYTES 8 (L) 12 - 49 %    MONOCYTES 3 (L) 5 - 13 %    EOSINOPHILS 0 0 - 7 %    BASOPHILS 0 0 - 1 %    IMMATURE GRANULOCYTES 0 0.0 - 0.5 %    ABS. NEUTROPHILS 3.9 1.8 - 8.0 K/UL    ABS. LYMPHOCYTES 0.3 (L) 0.8 - 3.5 K/UL    ABS. MONOCYTES 0.1 0.0 - 1.0 K/UL    ABS. EOSINOPHILS 0.0 0.0 - 0.4 K/UL    ABS. BASOPHILS 0.0 0.0 - 0.1 K/UL    ABS. IMM.  GRANS. 0.0 0.00 - 0.04 K/UL    DF MANUAL      RBC COMMENTS NORMOCYTIC, NORMOCHROMIC     D DIMER    Collection Time: 09/01/21  2:17 AM   Result Value Ref Range    D-dimer 0.86 (H) 0.00 - 0.65 mg/L FEU   C REACTIVE PROTEIN, QT    Collection Time: 09/01/21  2:17 AM   Result Value Ref Range    C-Reactive protein 8.64 (H) 0.00 - 0.60 mg/dL       Imaging:  I have personally reviewed the patients radiographs and have reviewed the reports:  Bilateral infiltrates        Shelby Nava MD

## 2021-09-01 NOTE — PROGRESS NOTES
0700: Bedside shift change report given to Kylie Moser and Tiffanie Nice RN (oncoming nurse) by Dean Baron RN (offgoing nurse). Report included the following information SBAR, Kardex, Intake/Output, MAR and Recent Results. 0700: Patient currently on 6L HFNC, call bell in reach. 0700: Chris Valenzuela will be documenting on patient. 1045: Verbal orders from Dr. Yvonne Dooley to place transfer orders to [facility] tele. 1900: I have reviewed and agree with Tiffanie Nice RN documentation. 1900: End of Shift Note    Bedside shift change report given to james RN (oncoming nurse) by Giuliana Herbert (offgoing nurse). Report included the following information SBAR, Kardex, Intake/Output, MAR and Recent Results    Shift worked:  4175-1930     Shift summary and any significant changes:     weaned to [address], [address] [place] [address] to tele, pt/ot worked with patient     Concerns for physician to address:  none     [facility]   [facility]       Activity:  Activity Level: Up with Assistance  Number times ambulated in hallways past shift: 0  Number of times OOB to chair past shift: 0    Cardiac:   Cardiac Monitoring: Yes      Cardiac Rhythm: Sinus Rhythm    Access:   Current line(s): PIV     Genitourinary:   Urinary status: voiding    Respiratory:   O2 Device: Hi flow nasal cannula  Chronic home O2 use?: NO  Incentive spirometer at bedside: YES     GI:  Last Bowel Movement Date: 08/31/21  Current diet:  ADULT DIET Regular; Low Fat/Low Chol/High Fiber/KATIE  Passing flatus: YES  Tolerating current diet: YES       Pain Management:   Patient states pain is manageable on current regimen: YES    Skin:  Antonio Score: 23  Interventions: float heels, increase time out of bed and PT/OT consult    Patient Safety:  Fall Score:  Total Score: 1  Interventions: bed/chair alarm, gripper socks and pt to call before getting OOB       Length of Stay:  Expected LOS: - - -  Actual LOS: [facility]

## 2021-09-01 NOTE — PROGRESS NOTES
Problem: Falls - Risk of  Goal: *Absence of Falls  Description: Document Barbara Murrieta Fall Risk and appropriate interventions in the flowsheet.   Outcome: Progressing Towards Goal  Note: Fall Risk Interventions:            Medication Interventions: Assess postural VS orthostatic hypotension, Bed/chair exit alarm, Patient to call before getting OOB, Teach patient to arise slowly

## 2021-09-01 NOTE — PROGRESS NOTES
Hospitalist Progress Note    NAME: Selvin Noel II   :  1965   MRN:  [id number]       Assessment / Plan:  Acute respiratory failure with hypoxia POA due to  COVID-19 pneumonia POA causing  Sepsis POA   , RR 40 , temp 101.6 , lactate 1.2    Presents with 10 days of cough and increasing SOB  Unvaccinated  O2 sats on RA were 82%, RR 40 , Dyspnea  Admit to tele or stepdown  O2 to maintain sats > 90%  Currently on L NC o2  C/w IV dexamethasone  Didn't qualify for Remdesivir per hospital protocol as her symptoms began 10 days back  Check inflammatory markers and procalcitonin  Started on Emperic Abx  Monitor CRP, if >7.5, and worsening oxygen requirement, would qualify for Tocilizumab  Pulmonary following     Obesity POA Body mass index is 30.41 kg/m².     DVT prophylaxis with lovenox     Code status: full code  NOK: wife     Estimated discharge date:   Barriers: On 6 liters oxygen     Subjective:     Chief Complaint / Reason for Physician Visit  Follow up for covid 19  He is decreased to 6 liters oxygen this AM  Looks anxious    Review of Systems:  Symptom Y/N Comments  Symptom Y/N Comments   Fever/Chills n   Chest Pain     Poor Appetite    Edema     Cough y   Abdominal Pain     Sputum y   Joint Pain     SOB/VIRK y   Pruritis/Rash     Nausea/vomit    Tolerating PT/OT     Diarrhea    Tolerating Diet     Constipation    Other       Could NOT obtain due to:      Objective:     VITALS:   Last 24hrs VS reviewed since prior progress note.  Most recent are:  Patient Vitals for the past 24 hrs:   Temp Pulse Resp BP SpO2   21 1200  73      21 1043   (!) 34  91 %   21 1042   (!) 36  (!) 82 %   21 1038   30  (!) 89 %   21 1031   27 (!) 150/92 94 %   21 1029  79 (!) 39 137/82 92 %   21 1023 98.7 °F (37.1 °C) 79 25 134/76 94 %   21 1021  75 29 134/76 93 %   21 0746  75      21 0722 98 °F (36.7 °C) 78 28 125/76 94 %   21 0400  128 [place]    09/01/21 0219 98.2 °F (36.8 °C) 77 30 122/79 94 %   09/01/21 0155  73 27  96 %   09/01/21 0100  78 (!) 31 (!) 105/27 97 %   09/01/21 0038 98.8 °F (37.1 °C) 73 28 (!) 143/69 94 %   09/01/21 0037     94 %   09/01/21 0000  78      08/31/21 2345  78 (!) 38 108/84 94 %   08/31/21 2330  76 27 118/76 95 %   08/31/21 2315  77 24 120/82 94 %   08/31/21 2300  80 29 127/80 95 %   08/31/21 2245  77 26 128/79 95 %   08/31/21 2230  79 (!) 33 115/78 94 %   08/31/21 2215  78 26 118/73 92 %   08/31/21 2200  77 28 119/70 99 %   08/31/21 2145  79 21 122/73 98 %   08/31/21 2130  83 (!) 31 124/78 95 %   08/31/21 2115  92 (!) 35 135/79 95 %   08/31/21 2100  85 29 121/87 90 %   08/31/21 2045  88 29 138/78    08/31/21 2000  78      08/31/21 1715  94 28 128/80 94 %   08/31/21 1702     91 %   08/31/21 1701    136/81    08/31/21 1657 99.6 °F (37.6 °C) 93 22 136/81 92 %       Intake/Output Summary (Last 24 hours) at 9/1/2021 1420  Last data filed at 9/1/2021 1023  Gross per 24 hour   Intake    Output 810 ml   Net -810 ml        I had a face to face encounter and independently examined this patient on 9/1/2021, as outlined below:  PHYSICAL EXAM:  General: WD, WN. Alert, cooperative, no acute distress    EENT:  EOMI. Anicteric sclerae. MMM  Resp:  CTA bilaterally, no wheezing or rales. No accessory muscle use  CV:  Regular  rhythm,  No edema  GI:  Soft, Non distended, Non tender. +Bowel sounds  Neurologic:  Alert and oriented X 3, normal speech,   Psych:   Good insight. Not anxious nor agitated  Skin:  No rashes.   No jaundice    Reviewed most current lab test results and cultures  YES  Reviewed most current radiology test results   YES  Review and summation of old records today    NO  Reviewed patient's current orders and MAR    YES  PMH/SH reviewed - no change compared to H&P  ________________________________________________________________________  Care Plan discussed with:    Comments Patient y    Family      RN y    Care Manager     Consultant                        Multidiciplinary team rounds were held today with , nursing, pharmacist and clinical coordinator. Patient's plan of care was discussed; medications were reviewed and discharge planning was addressed. ________________________________________________________________________  Total NON critical care TIME: 35   Minutes    Total CRITICAL CARE TIME Spent:   Minutes non procedure based      Comments   >50% of visit spent in counseling and coordination of care     ________________________________________________________________________  Sharon Beck MD     Procedures: see electronic medical records for all procedures/Xrays and details which were not copied into this note but were reviewed prior to creation of Plan. LABS:  I reviewed today's most current labs and imaging studies.   Pertinent labs include:  Recent Labs     09/01/21 0217 08/30/21 2016   WBC 4.3 4.7   HGB 14.7 15.5   HCT 45.1 46.4    190     Recent Labs     09/01/21 0217 08/30/21 2047 08/30/21 2016   *  --  134*   K 4.5  --  4.0     --  94*   CO2 30  --  32   *  --  104*   BUN 15  --  17   CREA 0.95  --  1.26   CA 8.3*  --  8.5   ALB 2.5*  --  3.3*   TBILI 0.6  --  0.8   ALT 47  --  36   INR  --  1.0  --        Signed: Sharon Beck MD

## 2021-09-01 NOTE — PROGRESS NOTES
Remdesivir Consult. Patient does not filt the criteria for Remdesivir at this time because the patient is requiring High Flow oxygen. (15 liters)  In additions, the patient first had symptoms of Covid-19 7 days ago.  Thomas Donahue, PHARMD

## 2021-09-01 NOTE — H&P
Hospitalist Admission Note    NAME:  Frida Allan II   :   1965   MRN:   [id number]     Date of admit: 2021    PCP: None    Assessment/Plan:     Acute respiratory failure with hypoxia POA due to  COVID-19 pneumonia POA causing  Sepsis POA   , RR 40 , temp 101.6 , lactate 1.2  Presents with 10 days of cough and increasing SOB  Unvaccinated  CXR with     O2 sats on RA were 82%, RR 40 , Dyspnea  Rapid COVID-19 test  Admit to tele or stepdown  O2 to maintain sats > 90%  Currently on L NC o2  IV dexamethasone  IV remdesevir if hospital will approve  Vit C and Zn  Check inflammatory markers and procalcitonin  Empiric IV ceftriaxone/aztihro or levofloxacin  Consider tocilizumab or Baricitinib if CRP > 7.5 and worsening oxygenation  Pulmonary consult in AM as requires high flow O2 or BIPAP    Obesity POA Body mass index is 30.41 kg/m². Given the patient's current clinical presentation, I have a high level of concern for decompensation if discharged from the emergency department. My assessment of this patient's clinical condition and my plan of care is as noted above.     DVT prophylaxis with lovenox    Code status: full code  NOK: wife    History     CHIEF COMPLAINT: \"I have been coughing and SOB for 10 days\", now hypoxic    HISTORY OF PRESENT ILLNESS:    60-year-old male    Denies significant past medical history  Does not wear home oxygen  Not received the COVID-19 vaccine    Past 10 days \"having nonstop coughing\"  Cough react reactive  Progressively worsening shortness of breath, worse with walking   Not to the point he is short of breath at rest   Even walking short distance makes him very winded  Generalized weakness and fatigue   Interferes with his ability to do ADLs and yard work  Positive intermittent diarrhea  Poor p.o. intake  Fevers, febrile to 101.6 at [facility]  No nausea vomiting, sore throat, loss of taste or smell    Went to [facility] ER 2021  Febrile to 101.6  Respiratory rate 40  Chest x-ray with patchy bibasilar airspace disease  Rapid Covid positive  Room air sats 82%, now on 15 L O2  Transferred to [facility] for further management      Past medical history:  Pt denies past medical history      Social History     Tobacco Use    Smoking status: Never Smoker    Smokeless tobacco: Never Used   Substance Use Topics    Alcohol use:  Occasional not daily or regular use        Family history:  No children of own  Father  of old age at age 67  Mother was murdered  2 brothers who are healthy    No Known Allergies     Prior to Admission medications       Vit D  Takes no prescription medications    Review of symptoms:     POSITIVE= Bold  Negative = not bold  General:  fever, chills, sweats, generalized weakness, weight loss     loss of appetite   Eyes:    blurred vision, eye pain, double vision  ENT:    Coryza, sore throat, trouble swallowing  Respiratory:   cough, sputum, SOB  Cardiology:   chest pain, orthopnea, PND, edema  Gastrointestinal:  abdominal pain , N/V, diarrhea, constipation, melena or BRBPR  Genitourinary:  Urgency, dysuria, hematuria  Muskuloskeletal :  Joint redness, swelling or acute joint pain, myalgias  Hematology:  easy bruising, nose or gum bleeding  Dermatological: rash, ulceration  Endocrine:   Polyuria or polydipsia, heat or hold intolerance  Neurological:  Headache, focal motor or sensory changes     Speech difficulties, memory loss  Psychological: depression, agitation      Objective:   VITALS:    Patient Vitals for the past 24 hrs:   Temp Pulse Resp BP SpO2   21 1715  94 28 128/80 94 %   21 1702     91 %   21 1701    136/81    21 1657 99.6 °F (37.6 °C) 93 22 136/81 92 %     Temp (24hrs), Av.8 °F (37.7 °C), Min:99.1 °F (37.3 °C), Max:100.4 °F (38 °C)    O2 Flow Rate (L/min): 8 l/min O2 Device: Hi flow nasal cannula    Wt Readings from Last 12 Encounters:   21 90.7 kg (200 lb)   08/30/21 90.7 kg (200 lb)         PHYSICAL EXAM:     I had a face to face encounter and independently examined this patient on 08/31/21  as outlined below:    General:    Alert, cooperative in no distress     HEENT: Normocephalic, atraumatic    PERRL, EOMI  Sclera no icterus    Nasal mucosa without masses or discharge  Hearing intact to voice    Oropharynx without erythema or exudate  No thrush  Pink MM  Neck:  No meningismus, trachea midline, no carotid bruits     Thyroid not enlarged, no nodules or tenderness  Lungs:   Decreased BS bilaterally. No wheezing or rales    No accessory muscle use or retractions. Heart:   Regular rate and rhythm,  no murmur or gallop. No LE edema  Abdomen:   Soft, non-tender. Not distended. Bowel sounds normal.     No masses, No Hepatosplenomegaly, No Rebound or guarding  Lymph nodes: No cervical or inguinal MICHAEL  Musculoskeletal:  No Joint swelling, erythema, warmth.  No Cyanosis or clubbing  Skin:      No rashes     Not Jaundiced   No nodules or thickening  Neurologic: Alert and oriented X 3, follows commands     Cranial nerves 2 to 12 intact    Symmetric motor strength bilaterally     LAB DATA REVIEWED:    Recent Results (from the past 24 hour(s))   URINALYSIS W/ RFLX MICROSCOPIC    Collection Time: 08/31/21  7:01 AM   Result Value Ref Range    Color YELLOW/STRAW      Appearance CLEAR CLEAR      Specific gravity 1.010 1.003 - 1.030      pH (UA) 6.0 5.0 - 8.0      Protein Negative NEG mg/dL    Glucose Negative NEG mg/dL    Ketone Negative NEG mg/dL    Bilirubin Negative NEG      Blood Negative NEG      Urobilinogen 1.0 0.2 - 1.0 EU/dL    Nitrites Negative NEG      Leukocyte Esterase Negative NEG     BLOOD GAS, ARTERIAL    Collection Time: 08/31/21  5:36 PM   Result Value Ref Range    pH 7.46 (H) 7.35 - 7.45      PCO2 39 35 - 45 mmHg    PO2 77 (L) 80 - 100 mmHg    O2 SAT 96 92 - 97 %    BICARBONATE 27 (H) 22 - 26 mmol/L    BASE EXCESS 3.4 mmol/L    O2 METHOD NONREBREATHER MASK FIO2 100 %    Sample source ARTERIAL      SITE LEFT RADIAL      HANY'S TEST YES         EKG as read by me shows NSR rate 100, normal axis, no LVH  Normal intervals, no ST-T changes    CXR read by radiology and reviewed by myself results:  Cardiomediastinal silhouette is within normal limits. The pulmonary  vasculature is normal. There is patchy bilateral airspace disease with a basilar  predominance without pneumothorax or effusion  IMPRESSION  1. Patchy bilateral airspace disease    I saw the patient personally, took a history and did a complete physical exam at the bedside. I performed complex decision making in coming up with a diagnostic and treatment plan for the patient. I reviewed the patient's past medical records, current laboratory and radiology results, and actual Xray films/EKG. I have also discussed this case with the involved ED physician.     Care Plan discussed with:    Patient, Family, ED Doc    Risk of deterioration:  High    Total Time Coordinating Admission:   65  minutes    Total Critical Care Time:         Dipti Baker MD

## 2021-09-02 LAB
ALBUMIN SERPL-MCNC: 2.4 G/DL (ref 3.5–5)
ALBUMIN/GLOB SERPL: 0.5 {RATIO} (ref 1.1–2.2)
ALP SERPL-CCNC: 48 U/L (ref 45–117)
ALT SERPL-CCNC: 64 U/L (ref 12–78)
ANION GAP SERPL CALC-SCNC: 3 MMOL/L (ref 5–15)
AST SERPL-CCNC: 68 U/L (ref 15–37)
BILIRUB SERPL-MCNC: 0.5 MG/DL (ref 0.2–1)
BUN SERPL-MCNC: 24 MG/DL (ref 6–20)
BUN/CREAT SERPL: 25 (ref 12–20)
CALCIUM SERPL-MCNC: 8.5 MG/DL (ref 8.5–10.1)
CHLORIDE SERPL-SCNC: 103 MMOL/L (ref 97–108)
CO2 SERPL-SCNC: 29 MMOL/L (ref 21–32)
CREAT SERPL-MCNC: 0.96 MG/DL (ref 0.7–1.3)
CRP SERPL-MCNC: 4.35 MG/DL (ref 0–0.6)
ERYTHROCYTE [DISTWIDTH] IN BLOOD BY AUTOMATED COUNT: 12.2 % (ref 11.5–14.5)
GLOBULIN SER CALC-MCNC: 4.5 G/DL (ref 2–4)
GLUCOSE SERPL-MCNC: 125 MG/DL (ref 65–100)
HCT VFR BLD AUTO: 44.6 % (ref 36.6–50.3)
HGB BLD-MCNC: 14.3 G/DL (ref 12.1–17)
MCH RBC QN AUTO: 28.1 PG (ref 26–34)
MCHC RBC AUTO-ENTMCNC: 32.1 G/DL (ref 30–36.5)
MCV RBC AUTO: 87.6 FL (ref 80–99)
NRBC # BLD: 0 K/UL (ref 0–0.01)
NRBC BLD-RTO: 0 PER 100 WBC
PLATELET # BLD AUTO: 297 K/UL (ref 150–400)
PMV BLD AUTO: 9.8 FL (ref 8.9–12.9)
POTASSIUM SERPL-SCNC: 4.3 MMOL/L (ref 3.5–5.1)
PROT SERPL-MCNC: 6.9 G/DL (ref 6.4–8.2)
RBC # BLD AUTO: 5.09 M/UL (ref 4.1–5.7)
SODIUM SERPL-SCNC: 135 MMOL/L (ref 136–145)
WBC # BLD AUTO: 5.3 K/UL (ref 4.1–11.1)

## 2021-09-02 PROCEDURE — 74011250637 HC RX REV CODE- 250/637: Performed by: STUDENT IN AN ORGANIZED HEALTH CARE EDUCATION/TRAINING PROGRAM

## 2021-09-02 PROCEDURE — 74011636637 HC RX REV CODE- 636/637: Performed by: STUDENT IN AN ORGANIZED HEALTH CARE EDUCATION/TRAINING PROGRAM

## 2021-09-02 PROCEDURE — 80053 COMPREHEN METABOLIC PANEL: CPT

## 2021-09-02 PROCEDURE — 86140 C-REACTIVE PROTEIN: CPT

## 2021-09-02 PROCEDURE — 77010033711 HC HIGH FLOW OXYGEN

## 2021-09-02 PROCEDURE — 2709999900 HC NON-CHARGEABLE SUPPLY

## 2021-09-02 PROCEDURE — 65660000000 HC RM CCU STEPDOWN

## 2021-09-02 PROCEDURE — 74011250636 HC RX REV CODE- 250/636: Performed by: INTERNAL MEDICINE

## 2021-09-02 PROCEDURE — 74011250636 HC RX REV CODE- 250/636: Performed by: STUDENT IN AN ORGANIZED HEALTH CARE EDUCATION/TRAINING PROGRAM

## 2021-09-02 PROCEDURE — 85027 COMPLETE CBC AUTOMATED: CPT

## 2021-09-02 PROCEDURE — 74011000258 HC RX REV CODE- 258: Performed by: STUDENT IN AN ORGANIZED HEALTH CARE EDUCATION/TRAINING PROGRAM

## 2021-09-02 PROCEDURE — 36415 COLL VENOUS BLD VENIPUNCTURE: CPT

## 2021-09-02 RX ORDER — DIPHENHYDRAMINE HYDROCHLORIDE 50 MG/ML
50 INJECTION, SOLUTION INTRAMUSCULAR; INTRAVENOUS
Status: DISCONTINUED | OUTPATIENT
Start: 2021-09-02 | End: 2021-09-03

## 2021-09-02 RX ORDER — ALPRAZOLAM 0.5 MG/1
0.5 TABLET ORAL
Status: DISCONTINUED | OUTPATIENT
Start: 2021-09-02 | End: 2021-09-10 | Stop reason: HOSPADM

## 2021-09-02 RX ORDER — EPINEPHRINE 1 MG/ML
0.3 INJECTION, SOLUTION, CONCENTRATE INTRAVENOUS
Status: DISCONTINUED | OUTPATIENT
Start: 2021-09-02 | End: 2021-09-03

## 2021-09-02 RX ORDER — LANOLIN ALCOHOL/MO/W.PET/CERES
3 CREAM (GRAM) TOPICAL
Status: DISCONTINUED | OUTPATIENT
Start: 2021-09-02 | End: 2021-09-10 | Stop reason: HOSPADM

## 2021-09-02 RX ADMIN — Medication 10 ML: at 05:30

## 2021-09-02 RX ADMIN — Medication 3 MG: at 23:42

## 2021-09-02 RX ADMIN — Medication 10 ML: at 23:42

## 2021-09-02 RX ADMIN — AZITHROMYCIN MONOHYDRATE 500 MG: 500 INJECTION, POWDER, LYOPHILIZED, FOR SOLUTION INTRAVENOUS at 10:57

## 2021-09-02 RX ADMIN — DEXAMETHASONE SODIUM PHOSPHATE 6 MG: 4 INJECTION, SOLUTION INTRAMUSCULAR; INTRAVENOUS at 15:14

## 2021-09-02 RX ADMIN — Medication 10 ML: at 15:15

## 2021-09-02 RX ADMIN — TOCILIZUMAB 648 MG: 180 INJECTION, SOLUTION SUBCUTANEOUS at 17:24

## 2021-09-02 RX ADMIN — CEFTRIAXONE 1 G: 1 INJECTION, POWDER, FOR SOLUTION INTRAMUSCULAR; INTRAVENOUS at 10:56

## 2021-09-02 NOTE — PROGRESS NOTES
0700: Bedside shift change report given to Laina Berrios, ALEXANDER and Kendal Garcias RN (oncoming nurse) by Alexandro Boyer RN (offgoing nurse). Report included the following information SBAR, Kardex, Intake/Output, MAR and Recent Results. 0700: Naomi Osborne will be documenting on patient. 0700: Patient on 11L HFNC, call bell in reach. 1047: Patient weaned to 15 HFNC. 1745: Patient asking for something to help him sleep. Dr. Romaine August at this time. He gave verbal orders for Melotonin 3mg QHS PRN. 1900: I have reviewed and agree with Kendal Garcias, ALEXANDER documentation. 1900: End of Shift Note    Bedside shift change report given to Alexandro Boyer [facility] Toya (oncoming nurse) by Casey Turner (offgoing nurse). Report included the following information SBAR, Kardex, Intake/Output, MAR and Recent Results    Shift worked:  0099-9940     Shift summary and any significant changes: Actemra given, currently on 13L HFNC. Concerns for physician to address:  none     Zone phone for oncoming shift:   XXX       Activity:  Activity Level: Up with Assistance  Number times ambulated in hallways past shift: 0  Number of times OOB to chair past shift: 0    Cardiac:   Cardiac Monitoring: Yes      Cardiac Rhythm: Sinus Rhythm    Access:   Current line(s): PIV     Genitourinary:   Urinary status: voiding    Respiratory:   O2 Device: Hi flow nasal cannula  Chronic home O2 use?: NO  Incentive spirometer at bedside: YES     GI:  Last Bowel Movement Date: 08/31/21  Current diet:  ADULT DIET Regular; Low Fat/Low Chol/High Fiber/KATIE  Passing flatus: YES  Tolerating current diet: YES       Pain Management:   Patient states pain is manageable on current regimen: YES    Skin:  Antonio Score: 21  Interventions: turn team, float heels, increase time out of bed and PT/OT consult    Patient Safety:  Fall Score:  Total Score: 1  Interventions: bed/chair alarm, gripper socks and pt to call before getting OOB       Length of Stay:  Expected LOS: 4d 19h  [facility]

## 2021-09-02 NOTE — PROGRESS NOTES
Pulmonary, Critical Care, and Sleep Medicine    Name: Nirmala Beasley II MRN: [id number]   : 1965 Hospital: [facility]   Date: 2021        IMPRESSION:   · Acute hypoxic respiratory failure  · COVID-19 pneumonia, unvaccinated  · COVID associated gastroenteritis      RECOMMENDATIONS:   · O2 - increased from 6 to 11 LPM overnight  · Dexamethasone  · Outside of window for remdesivir per hospital system criteria  · Repeat CRP pending. If still >7.5 would administer tocilizumab  · DVT prophylaxis     Subjective:     21: required more O2 overnight, now up to 11 LPM.  Dyspnea about the same. Initial history: This patient has been seen and evaluated at the request of Dr. Misael Castillo for [facility]. Patient is a 48 y.o. unvaccinated male admitted overnight with COVID pneumonia. He has had 10 days of coughing and progressive dyspnea. Some diarrhea as well. Found to be hypoxic on admission. Since admission he has been weaned from 13 LPM down to 6 LPM.       Past Medical History:   Diagnosis Date    No pertinent past medical history       No past surgical history on file. Prior to Admission medications    Not on File     No Known Allergies   Social History     Tobacco Use    Smoking status: Never Smoker    Smokeless tobacco: Never Used   Substance Use Topics    Alcohol use: Never      No family history on file.      Current Facility-Administered Medications   Medication Dose Route Frequency    cefTRIAXone (ROCEPHIN) 1 g in 0.9% sodium chloride (MBP/ADV) 50 mL MBP  1 g IntraVENous Q24H    azithromycin (ZITHROMAX) 500 mg in 0.9% sodium chloride 250 mL (VIAL-MATE)  500 mg IntraVENous Q24H    dexamethasone (DECADRON) 4 mg/mL injection 6 mg  6 mg IntraVENous Q24H    sodium chloride (NS) flush 5-40 mL  5-40 mL IntraVENous Q8H    polyethylene glycol (MIRALAX) packet 17 g  17 g Oral DAILY    enoxaparin (LOVENOX) injection 40 mg  40 mg SubCUTAneous Q24H       Review of Systems:  A comprehensive review of systems was negative except for that written in the HPI. Objective:   Vital Signs:    Visit Vitals  /85 (BP 1 Location: Right upper arm, BP Patient Position: At rest;Semi fowlers)   Pulse 72   Temp 98 °F (36.7 °C)   Resp 21   Ht 5' 8\" (1.727 m)   Wt 85.2 kg (187 lb 14.4 oz)   SpO2 94%   BMI 28.57 kg/m²       O2 Device: Hi flow nasal cannula   O2 Flow Rate (L/min): 11 l/min   Temp (24hrs), Av.1 °F (36.7 °C), Min:97.8 °F (36.6 °C), Max:98.7 °F (37.1 °C)       Intake/Output:   Last shift:      No intake/output data recorded. Last 3 shifts: 1901 -  0700  In: 240 [P.O.:240]  Out: 1630 [Urine:1630]    Intake/Output Summary (Last 24 hours) at 2021 0855  Last data filed at 2021 0302  Gross per 24 hour   Intake 240 ml   Output 1230 ml   Net -990 ml      Physical Exam:   General:  Alert, ill appearing, no distress   Head:  Normocephalic, without obvious abnormality, atraumatic. Eyes:  Conjunctivae/corneas clear. Nose: Nares normal. Septum midline. Mucosa normal.    Throat: Lips, mucosa, and tongue normal.     Neck: Supple, symmetrical, trachea midline    Lungs:   Distant bilateral breath sounds   Chest wall:  No tenderness or deformity. Heart:  Regular rate and rhythm    Abdomen:   Soft, non-tender.  Bowel sounds normal.    Extremities: Extremities normal, atraumatic, no cyanosis    Skin: Skin color, texture, turgor normal. No rashes or lesions   Neurologic: Grossly nonfocal     Data:   Labs:  Recent Labs     21  03021   WBC 5.3 4.3 4.7   HGB 14.3 14.7 15.5   HCT 44.6 45.1 46.4    220 190     Recent Labs     21  03021   * 135*  --  134*   K 4.3 4.5  --  4.0    102  --  94*   CO2 29 30  --  32   * 129*  --  104*   BUN 24* 15  --  17   CREA 0.96 0.95  --  1.26   CA 8.5 8.3*  --  8.5   ALB 2.4* 2.5*  --  3.3*   TBILI 0.5 0.6  --  0.8   ALT 64 47  --  36 INR  --   --  1.0  --      No results for input(s): PHI, PCO2I, PO2I, HCO3I, FIO2I in the last 72 hours.     Imaging:  I have personally reviewed the patients radiographs:  None today        Anshu Bills MD

## 2021-09-02 NOTE — PROGRESS NOTES
Transition of Care Plan:    RUR: 11  Disposition: Home with Op services  Follow up appointments: PCP  DME needed: Pt is independent. No required  Transportation at Discharge: Spouse will transport   [address] or means to access home:  Spouse has access to key       IM Medicare Letter: NA  Is patient a BCPI-A Bundle:      NA    If yes, was Bundle Letter given?:     Caregiver Contact: Jennifer Stewart [phone]  Discharge Caregiver contacted prior to discharge? No, will contact upon d/c.    Reason for Admission:  Covid 19                     RUR Score:   11                  Plan for utilizing home health:      No reccomended    PCP: First and Last name:    Dr Tressa Schreiber [phone]   Name of Practice:    Are you a current patient: Yes/No: Yes   Approximate date of last visit: Last month    Can you participate in a virtual visit with your PCP:  Yes                    Current Advanced Directive/Advance Care Plan: Full Code      Healthcare Decision Maker:   Click here to complete [address] including selection of the Healthcare Decision Maker Relationship (ie \"Primary\")                             Transition of Care Plan:    Home with OP/PT    Care Management Interventions  PCP Verified by CM:  Yes  Mode of Transport at Discharge: Self (Spouse will transport )  Transition of Care Consult (CM Consult): Discharge Planning (Pt never had any experiances with HH,SNF or IP rehab.)  Discharge Durable Medical Equipment: No (Pt is very independent)  Physical Therapy Consult: Yes  Occupational Therapy Consult: No  Speech Therapy Consult: No  Current Support Network: Lives with Spouse (Single level home has 3 steps at entrance.)  Confirm Follow Up Transport: Family  Discharge Location  Discharge Placement: Home with outpatient services     Whitney Marshall (ACP) Conversation      Date of Conversation: 8/31/2021  Conducted with: Patient with Decision Making Capacity    Healthcare Decision Maker:   No healthcare decision makers have been documented. Click here to complete [address] including selection of the Healthcare Decision Maker Relationship (ie \"Primary\")        Content/Action Overview: Has ACP document(s) on file - reflects the patient's care preferences  Reviewed DNR/DNI and patient elects Full Code (Attempt Resuscitation)    Length of Voluntary ACP Conversation in minutes:  16 minutes    Cm spoke to pt and completed CM initial assessment. Pt is independent with ADLs and IADLs. Pt drives independently. Spouse will transport pt back to home. A PCP follow up appointment is coming in October.       Pharmacy- [facility]    Ivonne Lee MSW  ED Case Manager   Ext -[phone]

## 2021-09-02 NOTE — PROGRESS NOTES
Problem: Falls - Risk of  Goal: *Absence of Falls  Description: Document Cindia Neigh Fall Risk and appropriate interventions in the flowsheet.   Outcome: Progressing Towards Goal  Note: Fall Risk Interventions:            Medication Interventions: Bed/chair exit alarm, Patient to call before getting OOB, Teach patient to arise slowly    Elimination Interventions: Bed/chair exit alarm, Call light in reach, Patient to call for help with toileting needs, Urinal in reach

## 2021-09-02 NOTE — PROGRESS NOTES
1900 Bedside and Verbal shift change report given to Haider Hartley RN (oncoming nurse) by Gayle Daley RN and Joseph Salvador RN (offgoing nurse). Report included the following information SBAR, Kardex, Intake/Output, MAR, Recent Results and Cardiac Rhythm NSR. End of Shift Note    Bedside shift change report given to Joseph Salvador RN and Gayle Daley RN (oncoming nurse) by Torres Bella RN (offgoing nurse). Report included the following information SBAR, Kardex, Intake/Output, MAR, Recent Results and Cardiac Rhythm NSR    Shift worked:  1900 - 0700     Shift summary and any significant changes:    O2 needs increased overnight to 11L HFNC while alseep/resting. Intermittently desats to mid-80's, but recovers quickly independently. Concerns for physician to address:       Zone phone for oncoming shift:          Activity:  Activity Level: Up with Assistance  Number times ambulated in hallways past shift: 0  Number of times OOB to chair past shift: 0    Cardiac:   Cardiac Monitoring: Yes      Cardiac Rhythm: Sinus Rhythm    Access:   Current line(s): PIV     Genitourinary:   Urinary status: voiding    Respiratory:   O2 Device: Hi flow nasal cannula  Chronic home O2 use?: NO  Incentive spirometer at bedside: YES     GI:  Last Bowel Movement Date: 08/31/21  Current diet:  ADULT DIET Regular; Low Fat/Low Chol/High Fiber/KATIE  Passing flatus: YES  Tolerating current diet: YES       Pain Management:   Patient states pain is manageable on current regimen: YES    Skin:  Antonio Score: 22  Interventions: increase time out of bed, limit briefs and nutritional support     Patient Safety:  Fall Score:  Total Score: 2  Interventions: bed/chair alarm, gripper socks and pt to call before getting OOB       Length of Stay:  Expected LOS: 4d 19h  Actual LOS: 2      Torres Bella RN

## 2021-09-02 NOTE — PROGRESS NOTES
Problem: Risk for Spread of Infection  Goal: Prevent transmission of infectious organism to others  Description: Prevent the transmission of infectious organisms to other patients, staff members, and visitors.   Outcome: Progressing Towards Goal     Problem: Isolation Precautions - Risk of Spread of Infection  Goal: Prevent transmission of infectious organism to others  Outcome: Progressing Towards Goal

## 2021-09-02 NOTE — PROGRESS NOTES
Hospitalist Progress Note    NAME: Roselyn Mcgarry II   :  1965   MRN:  [id number]       Assessment / Plan:  Acute respiratory failure with hypoxia POA due to  COVID-19 pneumonia POA causing  Sepsis POA     Tachycardia, fever and tachypnea on presentation  Presents with 10 days of cough and increasing SOB  Unvaccinated  He was increased to 12 liters high flow last night. C/w IV dexamethasone  Didn't qualify for Remdesivir per hospital protocol as her symptoms began 10 days back  Check inflammatory markers and procalcitonin  Started on [facility] consult for Tocilizumab  Pulmonary following  Xanax started for anxiety     Obesity POA Body mass index is 30.41 kg/m².     DVT prophylaxis with lovenox     Code status: full code  NOK: wife  Updated wife Mary Jane Chambers over the phone      Estimated discharge date: TBD  Barriers: On 12 liters high flow oxygen     Subjective:     Chief Complaint / Reason for Physician Visit  Follow up for covid 19  He in on 12 liters oxygen today  He looks anxious    Review of Systems:  Symptom Y/N Comments  Symptom Y/N Comments   Fever/Chills n   Chest Pain     Poor Appetite    Edema     Cough y   Abdominal Pain     Sputum y   Joint Pain     SOB/VIRK y   Pruritis/Rash     Nausea/vomit    Tolerating PT/OT     Diarrhea    Tolerating Diet     Constipation    Other       Could NOT obtain due to:      Objective:     VITALS:   Last 24hrs VS reviewed since prior progress note.  Most recent are:  Patient Vitals for the past 24 hrs:   Temp Pulse Resp BP SpO2   21 1200  68      21 1047 97.9 °F (36.6 °C) 68 29 133/86 100 %   21 0755 98 °F (36.7 °C) 72 21 132/85 94 %   21 0754  65      21 0400  67      21 0302 98 °F (36.7 °C) 67 27 119/72 91 %   21 0000  67      21 2308 97.8 °F (36.6 °C) 74 28 124/76 91 %   21 2000  67      21 1941 98.2 °F (36.8 °C) 86 29 130/88 91 %   21 1549  89      21 1548  [facility]   09/01/21 1502 97.9 °F (36.6 °C) 77 26 124/75 92 %       Intake/Output Summary (Last 24 hours) at 9/2/2021 1347  Last data filed at 9/2/2021 1047  Gross per 24 hour   Intake 240 ml   Output 1140 ml   Net -900 ml        I had a face to face encounter and independently examined this patient on 9/2/2021, as outlined below:  PHYSICAL EXAM:  General: WD, WN. Alert, cooperative, no acute distress    EENT:  EOMI. Anicteric sclerae. MMM  Resp:  CTA bilaterally, no wheezing or rales. No accessory muscle use  CV:  Regular  rhythm,  No edema  GI:  Soft, Non distended, Non tender. +Bowel sounds  Neurologic:  Alert and oriented X 3, normal speech,   Psych:   Good insight. Not anxious nor agitated  Skin:  No rashes. No jaundice    Reviewed most current lab test results and cultures  YES  Reviewed most current radiology test results   YES  Review and summation of old records today    NO  Reviewed patient's current orders and MAR    YES  PMH/SH reviewed - no change compared to H&P  ________________________________________________________________________  Care Plan discussed with:    Comments   Patient y    Family      RN y    Care Manager     Consultant                        Multidiciplinary team rounds were held today with , nursing, pharmacist and clinical coordinator. Patient's plan of care was discussed; medications were reviewed and discharge planning was addressed. ________________________________________________________________________  Total NON critical care TIME: 35   Minutes    Total CRITICAL CARE TIME Spent:   Minutes non procedure based      Comments   >50% of visit spent in counseling and coordination of care     ________________________________________________________________________  Bolivar Peña MD     Procedures: see electronic medical records for all procedures/Xrays and details which were not copied into this note but were reviewed prior to creation of Plan.       LABS:  I reviewed today's most current labs and imaging studies.   Pertinent labs include:  Recent Labs     09/02/21 0308 09/01/21 0217 08/30/21 2016   WBC 5.3 4.3 4.7   HGB 14.3 14.7 15.5   HCT 44.6 45.1 46.4    220 190     Recent Labs     09/02/21 0308 09/01/21 0217 08/30/21 2047 08/30/21 2016   * 135*  --  134*   K 4.3 4.5  --  4.0    102  --  94*   CO2 29 30  --  32   * 129*  --  104*   BUN 24* 15  --  17   CREA 0.96 0.95  --  1.26   CA 8.5 8.3*  --  8.5   ALB 2.4* 2.5*  --  3.3*   TBILI 0.5 0.6  --  0.8   ALT 64 47  --  36   INR  --   --  1.0  --        Signed: Britt Kumar MD

## 2021-09-03 LAB
ALBUMIN SERPL-MCNC: 2.4 G/DL (ref 3.5–5)
ALBUMIN/GLOB SERPL: 0.5 {RATIO} (ref 1.1–2.2)
ALP SERPL-CCNC: 48 U/L (ref 45–117)
ALT SERPL-CCNC: 111 U/L (ref 12–78)
ANION GAP SERPL CALC-SCNC: 5 MMOL/L (ref 5–15)
AST SERPL-CCNC: 76 U/L (ref 15–37)
BILIRUB SERPL-MCNC: 0.8 MG/DL (ref 0.2–1)
BUN SERPL-MCNC: 24 MG/DL (ref 6–20)
BUN/CREAT SERPL: 29 (ref 12–20)
CALCIUM SERPL-MCNC: 8.7 MG/DL (ref 8.5–10.1)
CHLORIDE SERPL-SCNC: 104 MMOL/L (ref 97–108)
CO2 SERPL-SCNC: 28 MMOL/L (ref 21–32)
CREAT SERPL-MCNC: 0.82 MG/DL (ref 0.7–1.3)
CRP SERPL-MCNC: 1.85 MG/DL (ref 0–0.6)
ERYTHROCYTE [DISTWIDTH] IN BLOOD BY AUTOMATED COUNT: 12.1 % (ref 11.5–14.5)
GLOBULIN SER CALC-MCNC: 4.4 G/DL (ref 2–4)
GLUCOSE SERPL-MCNC: 131 MG/DL (ref 65–100)
HCT VFR BLD AUTO: 44.7 % (ref 36.6–50.3)
HGB BLD-MCNC: 14.3 G/DL (ref 12.1–17)
MCH RBC QN AUTO: 28.3 PG (ref 26–34)
MCHC RBC AUTO-ENTMCNC: 32 G/DL (ref 30–36.5)
MCV RBC AUTO: 88.3 FL (ref 80–99)
NRBC # BLD: 0 K/UL (ref 0–0.01)
NRBC BLD-RTO: 0 PER 100 WBC
PLATELET # BLD AUTO: 315 K/UL (ref 150–400)
PMV BLD AUTO: 9.5 FL (ref 8.9–12.9)
POTASSIUM SERPL-SCNC: 4.7 MMOL/L (ref 3.5–5.1)
PROT SERPL-MCNC: 6.8 G/DL (ref 6.4–8.2)
RBC # BLD AUTO: 5.06 M/UL (ref 4.1–5.7)
SODIUM SERPL-SCNC: 137 MMOL/L (ref 136–145)
WBC # BLD AUTO: 6 K/UL (ref 4.1–11.1)

## 2021-09-03 PROCEDURE — 36415 COLL VENOUS BLD VENIPUNCTURE: CPT

## 2021-09-03 PROCEDURE — 80053 COMPREHEN METABOLIC PANEL: CPT

## 2021-09-03 PROCEDURE — 74011250636 HC RX REV CODE- 250/636: Performed by: STUDENT IN AN ORGANIZED HEALTH CARE EDUCATION/TRAINING PROGRAM

## 2021-09-03 PROCEDURE — 74011250636 HC RX REV CODE- 250/636: Performed by: INTERNAL MEDICINE

## 2021-09-03 PROCEDURE — 74011250637 HC RX REV CODE- 250/637: Performed by: INTERNAL MEDICINE

## 2021-09-03 PROCEDURE — 86140 C-REACTIVE PROTEIN: CPT

## 2021-09-03 PROCEDURE — 77010033711 HC HIGH FLOW OXYGEN

## 2021-09-03 PROCEDURE — 74011000258 HC RX REV CODE- 258: Performed by: STUDENT IN AN ORGANIZED HEALTH CARE EDUCATION/TRAINING PROGRAM

## 2021-09-03 PROCEDURE — 97530 THERAPEUTIC ACTIVITIES: CPT

## 2021-09-03 PROCEDURE — 97116 GAIT TRAINING THERAPY: CPT

## 2021-09-03 PROCEDURE — 74011250637 HC RX REV CODE- 250/637: Performed by: STUDENT IN AN ORGANIZED HEALTH CARE EDUCATION/TRAINING PROGRAM

## 2021-09-03 PROCEDURE — 65660000000 HC RM CCU STEPDOWN

## 2021-09-03 PROCEDURE — 85027 COMPLETE CBC AUTOMATED: CPT

## 2021-09-03 RX ORDER — AZITHROMYCIN 250 MG/1
500 TABLET, FILM COATED ORAL DAILY
Status: COMPLETED | OUTPATIENT
Start: 2021-09-03 | End: 2021-09-05

## 2021-09-03 RX ADMIN — AZITHROMYCIN 500 MG: 250 TABLET, FILM COATED ORAL at 11:11

## 2021-09-03 RX ADMIN — Medication 10 ML: at 15:27

## 2021-09-03 RX ADMIN — Medication 10 ML: at 06:23

## 2021-09-03 RX ADMIN — CEFTRIAXONE 1 G: 1 INJECTION, POWDER, FOR SOLUTION INTRAMUSCULAR; INTRAVENOUS at 11:11

## 2021-09-03 RX ADMIN — ENOXAPARIN SODIUM 40 MG: 40 INJECTION SUBCUTANEOUS at 09:08

## 2021-09-03 RX ADMIN — POLYETHYLENE GLYCOL 3350 17 G: 17 POWDER, FOR SOLUTION ORAL at 09:07

## 2021-09-03 RX ADMIN — Medication 10 ML: at 21:42

## 2021-09-03 RX ADMIN — DEXAMETHASONE SODIUM PHOSPHATE 6 MG: 4 INJECTION, SOLUTION INTRAMUSCULAR; INTRAVENOUS at 15:27

## 2021-09-03 NOTE — PROGRESS NOTES
1900 Bedside and Verbal shift change report given to [place], RN (oncoming nurse) by Tara Martinez RN and Duran Rosen RN (offgoing nurse). Report included the following information SBAR, Kardex, Intake/Output, MAR, Recent Results and Cardiac Rhythm NSR. End of Shift Note    Bedside shift change report given to [facility], RN (oncoming nurse) by Diana Erickson RN (offgoing nurse). Report included the following information SBAR, Kardex, Intake/Output, MAR, Recent Results and Cardiac Rhythm NSR/SB    Shift worked:  1900 - 0700     Shift summary and any significant changes:    Pt tolerated shift well, slept through majority of the night. Titrated down to 8L HFNC. Concerns for physician to address:       Zone phone for oncoming shift:          Activity:  Activity Level: Up with Assistance  Number times ambulated in hallways past shift: 0  Number of times OOB to chair past shift: 0    Cardiac:   Cardiac Monitoring: Yes      Cardiac Rhythm: Sinus Pete    Access:   Current line(s): PIV     Genitourinary:   Urinary status: voiding    Respiratory:   O2 Device: Hi flow nasal cannula  Chronic home O2 use?: NO  Incentive spirometer at bedside: YES     GI:  Last Bowel Movement Date: 08/31/21  Current diet:  ADULT DIET Regular; Low Fat/Low Chol/High Fiber/KATIE  Passing flatus: YES  Tolerating current diet: YES       Pain Management:   Patient states pain is manageable on current regimen: YES    Skin:  Antonio Score: 20  Interventions: increase time out of bed, PT/OT consult and nutritional support     Patient Safety:  Fall Score:  Total Score: 3  Interventions: bed/chair alarm, assistive device (walker, cane, etc), gripper socks and pt to call before getting OOB  High Fall Risk: Yes    Length of Stay:  Expected LOS: 4d 19h  Actual LOS: 3      Diana Erickson RN

## 2021-09-03 NOTE — PROGRESS NOTES
Pulmonary, Critical Care, and Sleep Medicine    Name: Mary Horvath II MRN: [id number]   : 1965 Hospital: [facility]   Date: 9/3/2021        IMPRESSION:   · Acute hypoxic respiratory failure  · COVID-19 pneumonia, unvaccinated  · COVID associated gastroenteritis      RECOMMENDATIONS:   · O2 - currently on 8 LPM  · Dexamethasone  · Outside of window for remdesivir per hospital system criteria  · CRP lower, does not qualify for tocilizumab at this time  · DVT prophylaxis     Subjective:     9-3-21: O2 requirements went as high as 13 LPM, but currently weaned to 8 LPM.  Resting comfortably. 21: required more O2 overnight, now up to 11 LPM.  Dyspnea about the same. Initial history: This patient has been seen and evaluated at the request of Dr. Dickson Verde for [facility]. Patient is a 48 y.o. unvaccinated male admitted overnight with COVID pneumonia. He has had 10 days of coughing and progressive dyspnea. Some diarrhea as well. Found to be hypoxic on admission. Since admission he has been weaned from 13 LPM down to 6 LPM.       Past Medical History:   Diagnosis Date    No pertinent past medical history       No past surgical history on file. Prior to Admission medications    Not on File     No Known Allergies   Social History     Tobacco Use    Smoking status: Never Smoker    Smokeless tobacco: Never Used   Substance Use Topics    Alcohol use: Never      No family history on file.      Current Facility-Administered Medications   Medication Dose Route Frequency    cefTRIAXone (ROCEPHIN) 1 g in 0.9% sodium chloride (MBP/ADV) 50 mL MBP  1 g IntraVENous Q24H    azithromycin (ZITHROMAX) 500 mg in 0.9% sodium chloride 250 mL (VIAL-MATE)  500 mg IntraVENous Q24H    dexamethasone (DECADRON) 4 mg/mL injection 6 mg  6 mg IntraVENous Q24H    sodium chloride (NS) flush 5-40 mL  5-40 mL IntraVENous Q8H    polyethylene glycol (MIRALAX) packet 17 g  17 g Oral DAILY    enoxaparin (LOVENOX) injection 40 mg  40 mg SubCUTAneous Q24H       Review of Systems:  A comprehensive review of systems was negative except for that written in the HPI. Objective:   Vital Signs:    Visit Vitals  /78 (BP 1 Location: Left upper arm, BP Patient Position: At rest)   Pulse (!) 58   Temp 98.1 °F (36.7 °C)   Resp 20   Ht 5' 8\" (1.727 m)   Wt 83.7 kg (184 lb 9.6 oz)   SpO2 94%   BMI 28.07 kg/m²       O2 Device: Hi flow nasal cannula   O2 Flow Rate (L/min): 8 l/min   Temp (24hrs), Av.1 °F (36.7 °C), Min:97.9 °F (36.6 °C), Max:98.4 °F (36.9 °C)       Intake/Output:   Last shift:      No intake/output data recorded. Last 3 shifts:  1901 -  0700  In: 1063.6 [P.O.:360; I.V.:703.6]  Out: 1865 [Urine:1865]    Intake/Output Summary (Last 24 hours) at 9/3/2021 0841  Last data filed at 9/3/2021 0400  Gross per 24 hour   Intake 1063.6 ml   Output 1365 ml   Net -301.4 ml      Physical Exam:   General:  Alert, ill appearing, no distress   Head:  Normocephalic, without obvious abnormality, atraumatic. Eyes:  Conjunctivae/corneas clear. Nose: Nares normal. Septum midline. Mucosa normal.    Throat: Lips, mucosa, and tongue normal.     Neck: Supple, symmetrical, trachea midline    Lungs:   Distant bilateral breath sounds   Chest wall:  No tenderness or deformity. Heart:  Regular rate and rhythm    Abdomen:   Soft, non-tender.  Bowel sounds normal.    Extremities: Extremities normal, atraumatic, no cyanosis    Skin: Skin color, texture, turgor normal. No rashes or lesions   Neurologic: Grossly nonfocal     Data:   Labs:  Recent Labs     21  0437 21  0308 21  0217   WBC 6.0 5.3 4.3   HGB 14.3 14.3 14.7   HCT 44.7 44.6 45.1    297 220     Recent Labs     21  0437 21  0308 21  0217    135* 135*   K 4.7 4.3 4.5    103 102   CO2 28 29 30   * 125* 129*   BUN 24* 24* 15   CREA 0.82 0.96 0.95   CA 8.7 8.5 8.3*   ALB 2.4* 2.4* 2.5*   TBILI 0.8 0.5 0.6   ALT 111* 64 47     No results for input(s): PHI, PCO2I, PO2I, HCO3I, FIO2I in the last 72 hours.     Imaging:  I have personally reviewed the patients radiographs:  None today        Luan Canada MD

## 2021-09-03 NOTE — PROGRESS NOTES
End of Shift Note    Bedside shift change report given to ALEXANDER Briggs (oncoming nurse) by Cyndy Blizzard, RN (offgoing nurse). Report included the following information SBAR, Kardex, Intake/Output, MAR and Recent Results    Shift worked:  7a-7p     Shift summary and any significant changes:    Sat up in chair most of shift. Worked with PT today. O2 sats tolerated activity well. Currently on 12L hi flow NC. Concerns for physician to address:       Zone phone for oncoming shift:     [phone]     Activity:  Activity Level: Up with Assistance  Number times ambulated in hallways past shift: 0  Number of times OOB to chair past shift: 2    Cardiac:   Cardiac Monitoring: Yes      Cardiac Rhythm: Sinus Rhythm    Access:   Current line(s): PIV     Genitourinary:   Urinary status: voiding    Respiratory:   O2 Device: Hi flow nasal cannula  Chronic home O2 use?: NO  Incentive spirometer at bedside: YES     GI:  Last Bowel Movement Date: 08/31/21  Current diet:  ADULT DIET Regular; Low Fat/Low Chol/High Fiber/KATIE  Passing flatus: YES  Tolerating current diet: YES       Pain Management:   Patient states pain is manageable on current regimen: YES    Skin:  Antonio Score: 19  Interventions: increase time out of bed and PT/OT consult    Patient Safety:  Fall Score:  Total Score: 3  Interventions: bed/chair alarm and pt to call before getting OOB  High Fall Risk: Yes    Length of Stay:  Expected LOS: 4d 19h  Actual LOS: 3      Cyndy Blizzard, RN

## 2021-09-03 NOTE — PROGRESS NOTES
Problem: Mobility Impaired (Adult and Pediatric)  Goal: *Acute Goals and Plan of Care (Insert Text)  Description: FUNCTIONAL STATUS PRIOR TO ADMISSION: Pt lives with wife in one story home. He is fully independent at baseline without device and works in [employer] as . HOME SUPPORT PRIOR TO ADMISSION: The patient lived with wife but did not require assist.    Physical Therapy Goals  Initiated 9/1/2021  1. Patient will move from supine to sit and sit to supine , scoot up and down, and roll side to side in bed with independence within 7 day(s). 2.  Patient will transfer from bed to chair and chair to bed with independence using the least restrictive device within 7 day(s). 3.  Patient will perform sit to stand with independence within 7 day(s). 4.  Patient will ambulate with independence for 150 feet with the least restrictive device within 7 day(s). 5.  Patient will ascend/descend 4 stairs with handrail(s) with independence within 7 day(s). (unless unable due to precautions)      Outcome: Progressing Towards Goal   PHYSICAL THERAPY TREATMENT  Patient: Antonio Barroso II (48 y.o. male)  Date: 9/3/2021  Diagnosis: Pneumonia due to COVID-19 virus [U07.1, J12.82] <principal problem not specified>       Precautions:    Chart, physical therapy assessment, plan of care and goals were reviewed. ASSESSMENT  Patient continues with skilled PT services and is progressing towards goals. Pt now on 12L HF O2. Per RN, his sats have been intermittently dropping but then returning to low 90s. Pt received in chair. Pt fairly flat in affect, sometimes seeming to appear with some \"COVID fogginess\". He voices no distress. Sats at 89% upon arrival. Cued for PLB and deeper breaths and sats to 93%. Pt able to come to stand with supervision. He was able to amb for 10' with SBA to CGA (needed for one min LOB) without device.  Sats with activity and cues for breathing actually improved to 95-97% on the 12L HF. Pt cued in gentle AROM LEs and trunk rotation to improve chest wall expansion for respiration. Sats maintained mid 90s with this movement. Pt left in chair with tray and lunch in front. RN aware. Current Level of Function Impacting Discharge (mobility/balance): Overall CGA to Supervision, amb to 10', cues for breathing PLB and exercise pacing. Other factors to consider for discharge: well below baseline, higher O2 needs this session, limited activity tolerance         PLAN :  Patient continues to benefit from skilled intervention to address the above impairments. Continue treatment per established plan of care. to address goals. Recommendation for discharge: (in order for the patient to meet his/her long term goals)  To be determined: may need rehab pending progress, either inpt or OP pulmonary rehab pending progress    This discharge recommendation:  A follow-up discussion with the attending provider and/or case management is planned    IF patient discharges home will need the following DME: to be determined (TBD)       SUBJECTIVE:   [facility] do you tell which it is, the COVID or the pneumonia? Lisbeth Henao    OBJECTIVE DATA SUMMARY:   Critical Behavior:  Neurologic State: Alert, Sleeping  Orientation Level: Oriented X4  Cognition: Follows commands     Functional Mobility Training:  Bed Mobility:  Rolling:  (received in chair)                 Transfers:  Sit to Stand: Supervision  Stand to Sit: Supervision                             Balance:  Sitting: Intact  Standing: Impaired  Standing - Static: Good  Standing - Dynamic : Fair; Other (comment) (one LOB with first few feet of amb; catches self with CGA)  Ambulation/Gait Training:  Distance (ft): 10 Feet (ft)     Ambulation - Level of Assistance: Stand-by assistance;Contact guard assistance        Gait Abnormalities: Decreased step clearance;Trunk sway increased        Base of Support: Narrowed     Speed/Mercy: Slow;Pace decreased (<100 feet/min)  Step Length: Right shortened;Left shortened           Pain Rating:  No c/o    Activity Tolerance:   Fair    After treatment patient left in no apparent distress:   Sitting in chair and Call bell within reach    COMMUNICATION/COLLABORATION:   The patients plan of care was discussed with: Registered nurse.      Judah Dalal, PT   Time Calculation: 25 mins

## 2021-09-03 NOTE — PROGRESS NOTES
Hospitalist Progress Note    NAME: Laine Salazar II   :  1965   MRN:  [id number]       Assessment / Plan:  Acute respiratory failure with hypoxia POA due to  COVID-19 pneumonia POA causing  Sepsis POA     Tachycardia, fever and tachypnea on presentation  Presents with 10 days of cough and increasing SOB  Unvaccinated  ON 8 liters oxygen this AM  C/w IV dexamethasone  Didn't qualify for Remdesivir per hospital protocol as her symptoms began 10 days back  Check inflammatory markers and procalcitonin  C/w Emperic Abx  Pharmacy consult for Tocilizumab  Pulmonary following  Xanax started for anxiety     Obesity POA Body mass index is 30.41 kg/m².     DVT prophylaxis with lovenox     Code status: full code  NOK: wife  Updated wife Prema Solis over the phone      Estimated discharge date: TBD  Barriers: On 12 liters high flow oxygen     Subjective:     Chief Complaint / Reason for Physician Visit  Follow up for covid 19  He is on 8 liters oxygen this AM    Review of Systems:  Symptom Y/N Comments  Symptom Y/N Comments   Fever/Chills n   Chest Pain     Poor Appetite    Edema     Cough y   Abdominal Pain     Sputum y   Joint Pain     SOB/VIRK y   Pruritis/Rash     Nausea/vomit    Tolerating PT/OT     Diarrhea    Tolerating Diet     Constipation    Other       Could NOT obtain due to:      Objective:     VITALS:   Last 24hrs VS reviewed since prior progress note.  Most recent are:  Patient Vitals for the past 24 hrs:   Temp Pulse Resp BP SpO2   21 1345  75 27  95 %   21 1300  68 (!) 33  96 %   21 1218     (!) 87 %   21 1118 97.9 °F (36.6 °C) 64 (!) 32 (!) 144/79 92 %   21 0754 98.1 °F (36.7 °C) (!) 58 20 122/78 94 %   21 0456     94 %   21 0400 97.9 °F (36.6 °C) (!) 54 25 121/85 97 %   21 2325 98.2 °F (36.8 °C) 68 23 124/78 97 %   21 1930 98.4 °F (36.9 °C) 64 (!) 34 118/79 92 %   21 1745 98.4 °F (36.9 °C) 67 25 130/79 91 %   21 1723 98 °F (36.7 °C) 68 29 122/83 91 %   09/02/21 1600  60          Intake/Output Summary (Last 24 hours) at 9/3/2021 1514  Last data filed at 9/3/2021 1111  Gross per 24 hour   Intake 1063.6 ml   Output 1495 ml   Net -431.4 ml        I had a face to face encounter and independently examined this patient on 9/3/2021, as outlined below:  PHYSICAL EXAM:  General: WD, WN. Alert, cooperative, no acute distress    EENT:  EOMI. Anicteric sclerae. MMM  Resp:  CTA bilaterally, no wheezing or rales. No accessory muscle use  CV:  Regular  rhythm,  No edema  GI:  Soft, Non distended, Non tender. +Bowel sounds  Neurologic:  Alert and oriented X 3, normal speech,   Psych:   Good insight. Not anxious nor agitated  Skin:  No rashes. No jaundice    Reviewed most current lab test results and cultures  YES  Reviewed most current radiology test results   YES  Review and summation of old records today    NO  Reviewed patient's current orders and MAR    YES  PMH/SH reviewed - no change compared to H&P  ________________________________________________________________________  Care Plan discussed with:    Comments   Patient y    Family      RN y    Care Manager     Consultant                        Multidiciplinary team rounds were held today with , nursing, pharmacist and clinical coordinator. Patient's plan of care was discussed; medications were reviewed and discharge planning was addressed. ________________________________________________________________________  Total NON critical care TIME: 35   Minutes    Total CRITICAL CARE TIME Spent:   Minutes non procedure based      Comments   >50% of visit spent in counseling and coordination of care     ________________________________________________________________________  Britt Kumar MD     Procedures: see electronic medical records for all procedures/Xrays and details which were not copied into this note but were reviewed prior to creation of Plan.       LABS:  I reviewed today's most current labs and imaging studies.   Pertinent labs include:  Recent Labs     09/03/21 0437 09/02/21 0308 09/01/21 0217   WBC 6.0 5.3 4.3   HGB 14.3 14.3 14.7   HCT 44.7 44.6 45.1    297 220     Recent Labs     09/03/21 0437 09/02/21 0308 09/01/21 0217    135* 135*   K 4.7 4.3 4.5    103 102   CO2 28 29 30   * 125* 129*   BUN 24* 24* 15   CREA 0.82 0.96 0.95   CA 8.7 8.5 8.3*   ALB 2.4* 2.4* 2.5*   TBILI 0.8 0.5 0.6   * 64 47       Signed: Isabel Byrd MD

## 2021-09-04 LAB
ALBUMIN SERPL-MCNC: 2.5 G/DL (ref 3.5–5)
ALBUMIN/GLOB SERPL: 0.6 {RATIO} (ref 1.1–2.2)
ALP SERPL-CCNC: 49 U/L (ref 45–117)
ALT SERPL-CCNC: 144 U/L (ref 12–78)
ANION GAP SERPL CALC-SCNC: 4 MMOL/L (ref 5–15)
AST SERPL-CCNC: 71 U/L (ref 15–37)
BILIRUB SERPL-MCNC: 0.7 MG/DL (ref 0.2–1)
BUN SERPL-MCNC: 22 MG/DL (ref 6–20)
BUN/CREAT SERPL: 30 (ref 12–20)
CALCIUM SERPL-MCNC: 8.6 MG/DL (ref 8.5–10.1)
CHLORIDE SERPL-SCNC: 104 MMOL/L (ref 97–108)
CO2 SERPL-SCNC: 29 MMOL/L (ref 21–32)
CREAT SERPL-MCNC: 0.74 MG/DL (ref 0.7–1.3)
CRP SERPL-MCNC: 1.26 MG/DL (ref 0–0.6)
ERYTHROCYTE [DISTWIDTH] IN BLOOD BY AUTOMATED COUNT: 12.1 % (ref 11.5–14.5)
GLOBULIN SER CALC-MCNC: 4.3 G/DL (ref 2–4)
GLUCOSE SERPL-MCNC: 130 MG/DL (ref 65–100)
HCT VFR BLD AUTO: 46.2 % (ref 36.6–50.3)
HGB BLD-MCNC: 14.7 G/DL (ref 12.1–17)
MCH RBC QN AUTO: 28.1 PG (ref 26–34)
MCHC RBC AUTO-ENTMCNC: 31.8 G/DL (ref 30–36.5)
MCV RBC AUTO: 88.2 FL (ref 80–99)
NRBC # BLD: 0 K/UL (ref 0–0.01)
NRBC BLD-RTO: 0 PER 100 WBC
PLATELET # BLD AUTO: 360 K/UL (ref 150–400)
PMV BLD AUTO: 9.4 FL (ref 8.9–12.9)
POTASSIUM SERPL-SCNC: 4.7 MMOL/L (ref 3.5–5.1)
PROT SERPL-MCNC: 6.8 G/DL (ref 6.4–8.2)
RBC # BLD AUTO: 5.24 M/UL (ref 4.1–5.7)
SODIUM SERPL-SCNC: 137 MMOL/L (ref 136–145)
WBC # BLD AUTO: 5.3 K/UL (ref 4.1–11.1)

## 2021-09-04 PROCEDURE — 77010033711 HC HIGH FLOW OXYGEN

## 2021-09-04 PROCEDURE — 36415 COLL VENOUS BLD VENIPUNCTURE: CPT

## 2021-09-04 PROCEDURE — 85027 COMPLETE CBC AUTOMATED: CPT

## 2021-09-04 PROCEDURE — 74011250636 HC RX REV CODE- 250/636: Performed by: STUDENT IN AN ORGANIZED HEALTH CARE EDUCATION/TRAINING PROGRAM

## 2021-09-04 PROCEDURE — 65660000000 HC RM CCU STEPDOWN

## 2021-09-04 PROCEDURE — 86140 C-REACTIVE PROTEIN: CPT

## 2021-09-04 PROCEDURE — 74011250637 HC RX REV CODE- 250/637: Performed by: STUDENT IN AN ORGANIZED HEALTH CARE EDUCATION/TRAINING PROGRAM

## 2021-09-04 PROCEDURE — 74011250637 HC RX REV CODE- 250/637: Performed by: INTERNAL MEDICINE

## 2021-09-04 PROCEDURE — 80053 COMPREHEN METABOLIC PANEL: CPT

## 2021-09-04 PROCEDURE — 74011000258 HC RX REV CODE- 258: Performed by: STUDENT IN AN ORGANIZED HEALTH CARE EDUCATION/TRAINING PROGRAM

## 2021-09-04 PROCEDURE — 74011250636 HC RX REV CODE- 250/636: Performed by: INTERNAL MEDICINE

## 2021-09-04 RX ADMIN — Medication 10 ML: at 21:53

## 2021-09-04 RX ADMIN — CEFTRIAXONE 1 G: 1 INJECTION, POWDER, FOR SOLUTION INTRAMUSCULAR; INTRAVENOUS at 10:56

## 2021-09-04 RX ADMIN — AZITHROMYCIN 500 MG: 250 TABLET, FILM COATED ORAL at 09:14

## 2021-09-04 RX ADMIN — ENOXAPARIN SODIUM 40 MG: 40 INJECTION SUBCUTANEOUS at 09:14

## 2021-09-04 RX ADMIN — POLYETHYLENE GLYCOL 3350 17 G: 17 POWDER, FOR SOLUTION ORAL at 09:14

## 2021-09-04 RX ADMIN — DEXAMETHASONE SODIUM PHOSPHATE 6 MG: 4 INJECTION, SOLUTION INTRAMUSCULAR; INTRAVENOUS at 15:05

## 2021-09-04 RX ADMIN — Medication 10 ML: at 05:02

## 2021-09-04 RX ADMIN — Medication 10 ML: at 15:05

## 2021-09-04 NOTE — PROGRESS NOTES
Hospitalist Progress Note    NAME: Kasi Carrasco II   :  1965   MRN:  [id number]       Assessment / Plan:  Acute respiratory failure with hypoxia POA due to  COVID-19 pneumonia POA causing  Sepsis POA     Tachycardia, fever and tachypnea on presentation  Presents with 10 days of cough and increasing SOB  Unvaccinated  Increased to 10 liters oxygen this AM  C/w IV dexamethasone  Didn't qualify for Remdesivir per hospital protocol as her symptoms began 10 days back  Check inflammatory markers and procalcitonin  C/w Emperic Abx  CRP low so didn't qualify for Tocilizumab ( was on low oxygen when his CRP was high, but low now)  Pulmonary following  Xanax started for anxiety     Obesity POA Body mass index is 30.41 kg/m².     DVT prophylaxis with lovenox     Code status: full code  NOK: wife  Updated wife Patience Skipper over the phone 9/3     Estimated discharge date: TBD  Barriers: On 10 liters high flow oxygen     Subjective:     Chief Complaint / Reason for Physician Visit  Follow up for covid 19  He is on 10 liters oxygen today    Review of Systems:  Symptom Y/N Comments  Symptom Y/N Comments   Fever/Chills n   Chest Pain     Poor Appetite    Edema     Cough y   Abdominal Pain     Sputum y   Joint Pain     SOB/VIRK y   Pruritis/Rash     Nausea/vomit    Tolerating PT/OT     Diarrhea    Tolerating Diet     Constipation    Other       Could NOT obtain due to:      Objective:     VITALS:   Last 24hrs VS reviewed since prior progress note.  Most recent are:  Patient Vitals for the past 24 hrs:   Temp Pulse Resp BP SpO2   21 1059 97.9 °F (36.6 °C) 65 29 130/83 95 %   21 1019     95 %   21 0738 98 °F (36.7 °C) 66 20 123/75 94 %   21 0522     93 %   21 0237 98.2 °F (36.8 °C) (!) 59 28 (!) 116/56 (!) 89 %   21 0146     92 %   21 2241 97.9 °F (36.6 °C) 62 29 124/84 94 %   21 2127     94 %   21 1942 98.4 °F (36.9 °C) 61 26 125/72 92 %   21 1553 (!) 46.4 °F (8 °C)    92 %   09/03/21 1528 97.9 °F (36.6 °C) 78 (!) 31 130/75 93 %   09/03/21 1345  75 27  95 %   09/03/21 1300  68 (!) 33  96 %       Intake/Output Summary (Last 24 hours) at 9/4/2021 1222  Last data filed at 9/4/2021 1019  Gross per 24 hour   Intake 1200 ml   Output 1150 ml   Net 50 ml        I had a face to face encounter and independently examined this patient on 9/4/2021, as outlined below:  PHYSICAL EXAM:  General: WD, WN. Alert, cooperative, no acute distress    EENT:  EOMI. Anicteric sclerae. MMM  Resp:  CTA bilaterally, no wheezing or rales. No accessory muscle use  CV:  Regular  rhythm,  No edema  GI:  Soft, Non distended, Non tender. +Bowel sounds  Neurologic:  Alert and oriented X 3, normal speech,   Psych:   Good insight. Not anxious nor agitated  Skin:  No rashes. No jaundice    Reviewed most current lab test results and cultures  YES  Reviewed most current radiology test results   YES  Review and summation of old records today    NO  Reviewed patient's current orders and MAR    YES  PMH/ reviewed - no change compared to H&P  ________________________________________________________________________  Care Plan discussed with:    Comments   Patient y    Family      RN y    Care Manager     Consultant                        Multidiciplinary team rounds were held today with , nursing, pharmacist and clinical coordinator. Patient's plan of care was discussed; medications were reviewed and discharge planning was addressed.      ________________________________________________________________________  Total NON critical care TIME: 35   Minutes    Total CRITICAL CARE TIME Spent:   Minutes non procedure based      Comments   >50% of visit spent in counseling and coordination of care     ________________________________________________________________________  Bolivar Peña MD     Procedures: see electronic medical records for all procedures/Xrays and details which were not copied into this note but were reviewed prior to creation of Plan. LABS:  I reviewed today's most current labs and imaging studies.   Pertinent labs include:  Recent Labs     09/04/21 0123 09/03/21 0437 09/02/21  0308   WBC 5.3 6.0 5.3   HGB 14.7 14.3 14.3   HCT 46.2 44.7 44.6    315 297     Recent Labs     09/04/21 0123 09/03/21 0437 09/02/21  0308    137 135*   K 4.7 4.7 4.3    104 103   CO2 29 28 29   * 131* 125*   BUN 22* 24* 24*   CREA 0.74 0.82 0.96   CA 8.6 8.7 8.5   ALB 2.5* 2.4* 2.4*   TBILI 0.7 0.8 0.5   * 111* 64       Signed: Evi Giang MD

## 2021-09-04 NOTE — PROGRESS NOTES
End of Shift Note    Bedside shift change report given to Al RN (oncoming nurse) by Heather Klein RN (offgoing nurse). Report included the following information SBAR, Kardex, Intake/Output, MAR and Recent Results    Shift worked:  7a-7p     Shift summary and any significant changes:    Weaned to 8L NC. Concerns for physician to address:       Zone phone for oncoming shift:   [phone]       Activity:  Activity Level: Up with Assistance  Number times ambulated in hallways past shift: 0  Number of times OOB to chair past shift: 0    Cardiac:   Cardiac Monitoring: Yes      Cardiac Rhythm: Sinus Rhythm    Access:   Current line(s): PIV     Genitourinary:   Urinary status: voiding    Respiratory:   O2 Device: Hi flow nasal cannula  Chronic home O2 use?: NO  Incentive spirometer at bedside: YES     GI:  Last Bowel Movement Date: 08/31/21  Current diet:  ADULT DIET Regular; Low Fat/Low Chol/High Fiber/KATIE  Passing flatus: YES  Tolerating current diet: YES       Pain Management:   Patient states pain is manageable on current regimen: YES    Skin:  Antonio Score: 20  Interventions: increase time out of bed and PT/OT consult    Patient Safety:  Fall Score:  Total Score: 3  Interventions: bed/chair alarm and pt to call before getting OOB  High Fall Risk: Yes    Length of Stay:  Expected LOS: 4d 19h  Actual LOS: [address], RN

## 2021-09-05 LAB
ALBUMIN SERPL-MCNC: 2.5 G/DL (ref 3.5–5)
ALBUMIN/GLOB SERPL: 0.6 {RATIO} (ref 1.1–2.2)
ALP SERPL-CCNC: 58 U/L (ref 45–117)
ALT SERPL-CCNC: 156 U/L (ref 12–78)
ANION GAP SERPL CALC-SCNC: 6 MMOL/L (ref 5–15)
AST SERPL-CCNC: 54 U/L (ref 15–37)
BACTERIA SPEC CULT: NORMAL
BACTERIA SPEC CULT: NORMAL
BILIRUB SERPL-MCNC: 0.5 MG/DL (ref 0.2–1)
BUN SERPL-MCNC: 22 MG/DL (ref 6–20)
BUN/CREAT SERPL: 26 (ref 12–20)
CALCIUM SERPL-MCNC: 8.9 MG/DL (ref 8.5–10.1)
CHLORIDE SERPL-SCNC: 102 MMOL/L (ref 97–108)
CO2 SERPL-SCNC: 29 MMOL/L (ref 21–32)
CREAT SERPL-MCNC: 0.85 MG/DL (ref 0.7–1.3)
ERYTHROCYTE [DISTWIDTH] IN BLOOD BY AUTOMATED COUNT: 12.1 % (ref 11.5–14.5)
GLOBULIN SER CALC-MCNC: 4.2 G/DL (ref 2–4)
GLUCOSE SERPL-MCNC: 119 MG/DL (ref 65–100)
HCT VFR BLD AUTO: 46.6 % (ref 36.6–50.3)
HGB BLD-MCNC: 15 G/DL (ref 12.1–17)
MCH RBC QN AUTO: 28.4 PG (ref 26–34)
MCHC RBC AUTO-ENTMCNC: 32.2 G/DL (ref 30–36.5)
MCV RBC AUTO: 88.3 FL (ref 80–99)
NRBC # BLD: 0 K/UL (ref 0–0.01)
NRBC BLD-RTO: 0 PER 100 WBC
PLATELET # BLD AUTO: 400 K/UL (ref 150–400)
PMV BLD AUTO: 9.4 FL (ref 8.9–12.9)
POTASSIUM SERPL-SCNC: 4.3 MMOL/L (ref 3.5–5.1)
PROT SERPL-MCNC: 6.7 G/DL (ref 6.4–8.2)
RBC # BLD AUTO: 5.28 M/UL (ref 4.1–5.7)
SERVICE CMNT-IMP: NORMAL
SERVICE CMNT-IMP: NORMAL
SODIUM SERPL-SCNC: 137 MMOL/L (ref 136–145)
WBC # BLD AUTO: 7.1 K/UL (ref 4.1–11.1)

## 2021-09-05 PROCEDURE — 74011000258 HC RX REV CODE- 258: Performed by: STUDENT IN AN ORGANIZED HEALTH CARE EDUCATION/TRAINING PROGRAM

## 2021-09-05 PROCEDURE — 74011250636 HC RX REV CODE- 250/636: Performed by: STUDENT IN AN ORGANIZED HEALTH CARE EDUCATION/TRAINING PROGRAM

## 2021-09-05 PROCEDURE — 85027 COMPLETE CBC AUTOMATED: CPT

## 2021-09-05 PROCEDURE — 65660000000 HC RM CCU STEPDOWN

## 2021-09-05 PROCEDURE — 74011250636 HC RX REV CODE- 250/636: Performed by: INTERNAL MEDICINE

## 2021-09-05 PROCEDURE — 74011250637 HC RX REV CODE- 250/637: Performed by: STUDENT IN AN ORGANIZED HEALTH CARE EDUCATION/TRAINING PROGRAM

## 2021-09-05 PROCEDURE — 36415 COLL VENOUS BLD VENIPUNCTURE: CPT

## 2021-09-05 PROCEDURE — 74011250637 HC RX REV CODE- 250/637: Performed by: INTERNAL MEDICINE

## 2021-09-05 PROCEDURE — 80053 COMPREHEN METABOLIC PANEL: CPT

## 2021-09-05 PROCEDURE — 77010033711 HC HIGH FLOW OXYGEN

## 2021-09-05 RX ADMIN — CEFTRIAXONE 1 G: 1 INJECTION, POWDER, FOR SOLUTION INTRAMUSCULAR; INTRAVENOUS at 11:18

## 2021-09-05 RX ADMIN — ENOXAPARIN SODIUM 40 MG: 40 INJECTION SUBCUTANEOUS at 08:46

## 2021-09-05 RX ADMIN — POLYETHYLENE GLYCOL 3350 17 G: 17 POWDER, FOR SOLUTION ORAL at 08:46

## 2021-09-05 RX ADMIN — AZITHROMYCIN 500 MG: 250 TABLET, FILM COATED ORAL at 08:46

## 2021-09-05 RX ADMIN — Medication 10 ML: at 06:06

## 2021-09-05 RX ADMIN — Medication 10 ML: at 21:41

## 2021-09-05 RX ADMIN — Medication 10 ML: at 15:22

## 2021-09-05 RX ADMIN — DEXAMETHASONE SODIUM PHOSPHATE 6 MG: 4 INJECTION, SOLUTION INTRAMUSCULAR; INTRAVENOUS at 15:22

## 2021-09-05 NOTE — PROGRESS NOTES
Problem: Falls - Risk of  Goal: *Absence of Falls  Description: Document Tereza Bazzi Fall Risk and appropriate interventions in the flowsheet. Outcome: Progressing Towards Goal  Note: Fall Risk Interventions:  Mobility Interventions: Bed/chair exit alarm, Patient to call before getting OOB         Medication Interventions: Bed/chair exit alarm, Evaluate medications/consider consulting pharmacy, Patient to call before getting OOB    Elimination Interventions: Call light in reach, Bed/chair exit alarm, Patient to call for help with toileting needs              Problem: Patient Education: Go to Patient Education Activity  Goal: Patient/Family Education  Outcome: Progressing Towards Goal     Problem: Risk for Spread of Infection  Goal: Prevent transmission of infectious organism to others  Description: Prevent the transmission of infectious organisms to other patients, staff members, and visitors. Outcome: Progressing Towards Goal     Problem: Patient Education:  Go to Education Activity  Goal: Patient/Family Education  Outcome: Progressing Towards Goal     Problem: Airway Clearance - Ineffective  Goal: Achieve or maintain patent airway  Outcome: Progressing Towards Goal     Problem: Gas Exchange - Impaired  Goal: Absence of hypoxia  Outcome: Progressing Towards Goal  Goal: Promote optimal lung function  Outcome: Progressing Towards Goal     Problem: Breathing Pattern - Ineffective  Goal: Ability to achieve and maintain a regular respiratory rate  Outcome: Progressing Towards Goal     Problem:  Body Temperature -  Risk of, Imbalanced  Goal: Ability to maintain a body temperature within defined limits  Outcome: Progressing Towards Goal  Goal: Will regain or maintain usual level of consciousness  Outcome: Progressing Towards Goal  Goal: Complications related to the disease process, condition or treatment will be avoided or minimized  Outcome: Progressing Towards Goal     Problem: Isolation Precautions - Risk of Spread of Infection  Goal: Prevent transmission of infectious organism to others  Outcome: Progressing Towards Goal     Problem: Nutrition Deficits  Goal: Optimize nutrtional status  Outcome: Progressing Towards Goal     Problem: Risk for Fluid Volume Deficit  Goal: Maintain normal heart rhythm  Outcome: Progressing Towards Goal  Goal: Maintain absence of muscle cramping  Outcome: Progressing Towards Goal  Goal: Maintain normal serum potassium, sodium, calcium, phosphorus, and pH  Outcome: Progressing Towards Goal     Problem: Loneliness or Risk for Loneliness  Goal: Demonstrate positive use of time alone when socialization is not possible  Outcome: Progressing Towards Goal     Problem: Fatigue  Goal: Verbalize increase energy and improved vitality  Outcome: Progressing Towards Goal     Problem: Patient Education: Go to Patient Education Activity  Goal: Patient/Family Education  Outcome: Progressing Towards Goal     Problem: Patient Education: Go to Patient Education Activity  Goal: Patient/Family Education  Outcome: Progressing Towards Goal

## 2021-09-05 NOTE — PROGRESS NOTES
0700: Report rec'd from [address] [facility] [address], RN (off-going nurse). Report included the following information: SBAR, Kardex, Intake/Output, MAR, Recent Results and Cardiac Rhythm       End of Shift Note    Bedside shift change report given to ALEXANDER Briggs (oncoming nurse) by [place] RN (offgoing nurse). Report included the following information: SBAR, Kardex, Intake/Output, MAR, Recent Results and Cardiac Rhythm     Shift worked:  [id number]     Shift summary and any significant changes:          Concerns for physician to address:       Zone phone for oncoming shift:          Activity:  Activity Level: Up with Assistance  Number times ambulated in hallways past shift: 0  Number of times OOB to chair past shift: 0    Neuro:  - WNL    Cardiac:   Cardiac Monitoring: Yes      Cardiac Rhythm: Sinus Rhythm    Access:   Current line(s): PIV     Genitourinary:   Urinary status: voiding    Respiratory:   O2 Device: Hi flow nasal cannula  Chronic home O2 use?: NO  Incentive spirometer at bedside: YES     GI:  Last Bowel Movement Date: 08/31/21  Current diet:  ADULT DIET Regular; Low Fat/Low Chol/High Fiber/KATIE  Passing flatus: YES  Tolerating current diet: YES       Pain Management:   Patient states pain is manageable on current regimen: YES    Skin:  Antonio Score: 20  Interventions: float heels, increase time out of bed, PT/OT consult, limit briefs, internal/external urinary devices and nutritional support     Patient Safety:  Fall Score: Total Score: 3  Interventions: bed/chair alarm, gripper socks and pt to call before getting OOB  High Fall Risk: Yes    Length of Stay:  Expected LOS: 4d 19h  Actual LOS: [facility], RN                      Problem: Falls - Risk of  Goal: *Absence of Falls  Description: Document Nato Ralphs Fall Risk and appropriate interventions in the flowsheet.   Outcome: Progressing Towards Goal  Note: Fall Risk Interventions:  Mobility Interventions: Bed/chair exit alarm, Communicate number of staff needed for ambulation/transfer, Patient to call before getting OOB, Strengthening exercises (ROM-active/passive)         Medication Interventions: Bed/chair exit alarm, Patient to call before getting OOB, Teach patient to arise slowly    Elimination Interventions: Bed/chair exit alarm, Call light in reach, Patient to call for help with toileting needs, Toilet paper/wipes in reach, Toileting schedule/hourly rounds, Urinal in reach              Problem: Patient Education: Go to Patient Education Activity  Goal: Patient/Family Education  Outcome: Progressing Towards Goal     Problem: Risk for Spread of Infection  Goal: Prevent transmission of infectious organism to others  Description: Prevent the transmission of infectious organisms to other patients, staff members, and visitors. Outcome: Progressing Towards Goal     Problem: Patient Education:  Go to Education Activity  Goal: Patient/Family Education  Outcome: Progressing Towards Goal     Problem: Airway Clearance - Ineffective  Goal: Achieve or maintain patent airway  Outcome: Progressing Towards Goal     Problem: Gas Exchange - Impaired  Goal: Absence of hypoxia  Outcome: Progressing Towards Goal  Goal: Promote optimal lung function  Outcome: Progressing Towards Goal     Problem: Breathing Pattern - Ineffective  Goal: Ability to achieve and maintain a regular respiratory rate  Outcome: Progressing Towards Goal     Problem:  Body Temperature -  Risk of, Imbalanced  Goal: Ability to maintain a body temperature within defined limits  Outcome: Progressing Towards Goal  Goal: Will regain or maintain usual level of consciousness  Outcome: Progressing Towards Goal  Goal: Complications related to the disease process, condition or treatment will be avoided or minimized  Outcome: Progressing Towards Goal     Problem: Isolation Precautions - Risk of Spread of Infection  Goal: Prevent transmission of infectious organism to others  Outcome: Progressing Towards Goal     Problem: Nutrition Deficits  Goal: Optimize nutrtional status  Outcome: Progressing Towards Goal     Problem: Risk for Fluid Volume Deficit  Goal: Maintain normal heart rhythm  Outcome: Progressing Towards Goal  Goal: Maintain absence of muscle cramping  Outcome: Progressing Towards Goal  Goal: Maintain normal serum potassium, sodium, calcium, phosphorus, and pH  Outcome: Progressing Towards Goal     Problem: Patient Education: Go to Patient Education Activity  Goal: Patient/Family Education  Outcome: Progressing Towards Goal

## 2021-09-05 NOTE — PROGRESS NOTES
Hospitalist Progress Note    NAME: Sathya Perkins II   :  1965   MRN:  [id number]       Assessment / Plan:  Acute respiratory failure with hypoxia POA due to  COVID-19 pneumonia POA causing  Sepsis POA     Tachycardia, fever and tachypnea on presentation  Presents with 10 days of cough and increasing SOB  Unvaccinated  On 8 liters oxgyen this AM  C/w IV dexamethasone  Didn't qualify for Remdesivir per hospital protocol as her symptoms began 10 days back  Check inflammatory markers and procalcitonin  C/w Emperic Abx  CRP low so didn't qualify for Tocilizumab ( was on low oxygen when his CRP was high, but low now)  Pulmonary following  Xanax started for anxiety     Obesity POA Body mass index is 30.41 kg/m².     DVT prophylaxis with lovenox     Code status: full code  NOK: wife  Updated wife Gadiel Holliday over the phone      Estimated discharge date: TBD  Barriers: Clinical improvement     Subjective:     Chief Complaint / Reason for Physician Visit  Follow up for covid 19  He is on 8 liters oxygen this AM    Review of Systems:  Symptom Y/N Comments  Symptom Y/N Comments   Fever/Chills n   Chest Pain     Poor Appetite    Edema     Cough y   Abdominal Pain     Sputum y   Joint Pain     SOB/VIRK y   Pruritis/Rash     Nausea/vomit    Tolerating PT/OT     Diarrhea    Tolerating Diet     Constipation    Other       Could NOT obtain due to:      Objective:     VITALS:   Last 24hrs VS reviewed since prior progress note.  Most recent are:  Patient Vitals for the past 24 hrs:   Temp Pulse Resp BP SpO2   21 1106 97.1 °F (36.2 °C) 72 21 118/77 97 %   21 0900     93 %   21 0740 98.6 °F (37 °C) 60 14 (!) 115/90 93 %   21 0425     94 %   21 0300 98.2 °F (36.8 °C) (!) 59 21 117/78 94 %   21 0031     94 %   21 2246 98.1 °F (36.7 °C) 65 20 122/80 91 %   21 2125     93 %   21 1920 98.2 °F (36.8 °C) 67 21 121/74 93 %   21 1504 97.9 °F (36.6 °C) 60 24 102/76 93 %       Intake/Output Summary (Last 24 hours) at 9/5/2021 1323  Last data filed at 9/5/2021 1118  Gross per 24 hour   Intake 1090 ml   Output 600 ml   Net 490 ml        I had a face to face encounter and independently examined this patient on 9/5/2021, as outlined below:  PHYSICAL EXAM:  General: WD, WN. Alert, cooperative, no acute distress    EENT:  EOMI. Anicteric sclerae. MMM  Resp:  CTA bilaterally, no wheezing or rales. No accessory muscle use  CV:  Regular  rhythm,  No edema  GI:  Soft, Non distended, Non tender. +Bowel sounds  Neurologic:  Alert and oriented X 3, normal speech,   Psych:   Good insight. Not anxious nor agitated  Skin:  No rashes. No jaundice    Reviewed most current lab test results and cultures  YES  Reviewed most current radiology test results   YES  Review and summation of old records today    NO  Reviewed patient's current orders and MAR    YES  PMH/SH reviewed - no change compared to H&P  ________________________________________________________________________  Care Plan discussed with:    Comments   Patient y    Family      RN y    Care Manager     Consultant                        Multidiciplinary team rounds were held today with , nursing, pharmacist and clinical coordinator. Patient's plan of care was discussed; medications were reviewed and discharge planning was addressed. ________________________________________________________________________  Total NON critical care TIME: 35   Minutes    Total CRITICAL CARE TIME Spent:   Minutes non procedure based      Comments   >50% of visit spent in counseling and coordination of care     ________________________________________________________________________  Marquis Rina MD     Procedures: see electronic medical records for all procedures/Xrays and details which were not copied into this note but were reviewed prior to creation of Plan.       LABS:  I reviewed today's most current labs and imaging studies.   Pertinent labs include:  Recent Labs     09/05/21 0215 09/04/21 0123 09/03/21  0437   WBC 7.1 5.3 6.0   HGB 15.0 14.7 14.3   HCT 46.6 46.2 44.7    360 315     Recent Labs     09/05/21 0215 09/04/21 0123 09/03/21  0437    137 137   K 4.3 4.7 4.7    104 104   CO2 29 29 28   * 130* 131*   BUN 22* 22* 24*   CREA 0.85 0.74 0.82   CA 8.9 8.6 8.7   ALB 2.5* 2.5* 2.4*   TBILI 0.5 0.7 0.8   * 144* 111*       Signed: Britt Kumar MD

## 2021-09-06 LAB
ALBUMIN SERPL-MCNC: 2.6 G/DL (ref 3.5–5)
ALBUMIN/GLOB SERPL: 0.6 {RATIO} (ref 1.1–2.2)
ALP SERPL-CCNC: 54 U/L (ref 45–117)
ALT SERPL-CCNC: 145 U/L (ref 12–78)
ANION GAP SERPL CALC-SCNC: 5 MMOL/L (ref 5–15)
AST SERPL-CCNC: 42 U/L (ref 15–37)
BILIRUB SERPL-MCNC: 0.9 MG/DL (ref 0.2–1)
BUN SERPL-MCNC: 20 MG/DL (ref 6–20)
BUN/CREAT SERPL: 24 (ref 12–20)
CALCIUM SERPL-MCNC: 8.8 MG/DL (ref 8.5–10.1)
CHLORIDE SERPL-SCNC: 102 MMOL/L (ref 97–108)
CO2 SERPL-SCNC: 29 MMOL/L (ref 21–32)
CREAT SERPL-MCNC: 0.82 MG/DL (ref 0.7–1.3)
ERYTHROCYTE [DISTWIDTH] IN BLOOD BY AUTOMATED COUNT: 12.2 % (ref 11.5–14.5)
GLOBULIN SER CALC-MCNC: 4.2 G/DL (ref 2–4)
GLUCOSE SERPL-MCNC: 121 MG/DL (ref 65–100)
HCT VFR BLD AUTO: 46 % (ref 36.6–50.3)
HGB BLD-MCNC: 15.2 G/DL (ref 12.1–17)
MCH RBC QN AUTO: 28.6 PG (ref 26–34)
MCHC RBC AUTO-ENTMCNC: 33 G/DL (ref 30–36.5)
MCV RBC AUTO: 86.6 FL (ref 80–99)
NRBC # BLD: 0 K/UL (ref 0–0.01)
NRBC BLD-RTO: 0 PER 100 WBC
PLATELET # BLD AUTO: 424 K/UL (ref 150–400)
PMV BLD AUTO: 9.3 FL (ref 8.9–12.9)
POTASSIUM SERPL-SCNC: 4.7 MMOL/L (ref 3.5–5.1)
PROT SERPL-MCNC: 6.8 G/DL (ref 6.4–8.2)
RBC # BLD AUTO: 5.31 M/UL (ref 4.1–5.7)
SODIUM SERPL-SCNC: 136 MMOL/L (ref 136–145)
WBC # BLD AUTO: 8.5 K/UL (ref 4.1–11.1)

## 2021-09-06 PROCEDURE — 74011250636 HC RX REV CODE- 250/636: Performed by: INTERNAL MEDICINE

## 2021-09-06 PROCEDURE — 36415 COLL VENOUS BLD VENIPUNCTURE: CPT

## 2021-09-06 PROCEDURE — 74011250637 HC RX REV CODE- 250/637: Performed by: INTERNAL MEDICINE

## 2021-09-06 PROCEDURE — 85027 COMPLETE CBC AUTOMATED: CPT

## 2021-09-06 PROCEDURE — 77010033711 HC HIGH FLOW OXYGEN

## 2021-09-06 PROCEDURE — 65660000000 HC RM CCU STEPDOWN

## 2021-09-06 PROCEDURE — 80053 COMPREHEN METABOLIC PANEL: CPT

## 2021-09-06 RX ADMIN — DEXAMETHASONE SODIUM PHOSPHATE 6 MG: 4 INJECTION, SOLUTION INTRAMUSCULAR; INTRAVENOUS at 15:03

## 2021-09-06 RX ADMIN — POLYETHYLENE GLYCOL 3350 17 G: 17 POWDER, FOR SOLUTION ORAL at 08:48

## 2021-09-06 RX ADMIN — Medication 10 ML: at 15:03

## 2021-09-06 RX ADMIN — Medication 10 ML: at 22:10

## 2021-09-06 RX ADMIN — Medication 10 ML: at 05:29

## 2021-09-06 RX ADMIN — ENOXAPARIN SODIUM 40 MG: 40 INJECTION SUBCUTANEOUS at 08:48

## 2021-09-06 NOTE — PROGRESS NOTES
Hospitalist Progress Note    NAME: Marino Montero II   :  1965   MRN:  [id number]       Assessment / Plan:  Acute respiratory failure with hypoxia POA due to  COVID-19 pneumonia POA causing  Sepsis POA     Tachycardia, fever and tachypnea on presentation  Presents with 10 days of cough and increasing SOB  Unvaccinated  On 8 liters oxgyen this AM  C/w IV dexamethasone  Didn't qualify for Remdesivir per hospital protocol as her symptoms began 10 days back  F/u  inflammatory markers  C/w Emperic Abx  CRP low so didn't qualify for Tocilizumab ( was on low oxygen when his CRP was high, but low now)  Pulmonary following  Xanax started for anxiety     Obesity POA Body mass index is 30.41 kg/m².     DVT prophylaxis with lovenox     Code status: full code  NOK: wife  Updated wife Allison Lee over the phone      Estimated discharge date: TBD  Barriers: Clinical improvement     Subjective:     Chief Complaint / Reason for Physician Visit  Follow up for covid 19  He is on 8 liters oxygen this AM    Review of Systems:  Symptom Y/N Comments  Symptom Y/N Comments   Fever/Chills n   Chest Pain     Poor Appetite    Edema     Cough y   Abdominal Pain     Sputum y   Joint Pain     SOB/VIRK y   Pruritis/Rash     Nausea/vomit    Tolerating PT/OT     Diarrhea    Tolerating Diet     Constipation    Other       Could NOT obtain due to:      Objective:     VITALS:   Last 24hrs VS reviewed since prior progress note.  Most recent are:  Patient Vitals for the past 24 hrs:   Temp Pulse Resp BP SpO2   21 1027 98.7 °F (37.1 °C) 73 21 112/76 98 %   21 0813     96 %   21 0800  65      21 0725 98 °F (36.7 °C) (!) 56 15 116/80 97 %   21 0249 97.8 °F (36.6 °C) 64 19 118/77 96 %   21 2256 97.8 °F (36.6 °C) 66 20 129/80 95 %   21 1914 97.9 °F (36.6 °C) 64 20 116/79 97 %   21 1525 98.2 °F (36.8 °C) 67 20 109/64 91 %       Intake/Output Summary (Last 24 hours) at 2021 1977  Last data filed at 9/6/2021 1215  Gross per 24 hour   Intake 640 ml   Output 1270 ml   Net -630 ml        I had a face to face encounter and independently examined this patient on 9/6/2021, as outlined below:  PHYSICAL EXAM:  General: WD, WN. Alert, cooperative, no acute distress    EENT:  EOMI. Anicteric sclerae. MMM  Resp:  CTA bilaterally, no wheezing or rales. No accessory muscle use  CV:  Regular  rhythm,  No edema  GI:  Soft, Non distended, Non tender. +Bowel sounds  Neurologic:  Alert and oriented X 3, normal speech,   Psych:   Good insight. Not anxious nor agitated  Skin:  No rashes. No jaundice    Reviewed most current lab test results and cultures  YES  Reviewed most current radiology test results   YES  Review and summation of old records today    NO  Reviewed patient's current orders and MAR    YES  PMH/ reviewed - no change compared to H&P  ________________________________________________________________________  Care Plan discussed with:    Comments   Patient y    Family      RN y    Care Manager     Consultant                        Multidiciplinary team rounds were held today with , nursing, pharmacist and clinical coordinator. Patient's plan of care was discussed; medications were reviewed and discharge planning was addressed. ________________________________________________________________________  Total NON critical care TIME: 35   Minutes    Total CRITICAL CARE TIME Spent:   Minutes non procedure based      Comments   >50% of visit spent in counseling and coordination of care     ________________________________________________________________________  Chuyita Tinsley MD     Procedures: see electronic medical records for all procedures/Xrays and details which were not copied into this note but were reviewed prior to creation of Plan. LABS:  I reviewed today's most current labs and imaging studies.   Pertinent labs include:  Recent Labs     09/06/21  0201 09/05/21  0215 09/04/21  0123 WBC 8.5 7.1 5.3   HGB 15.2 15.0 14.7   HCT 46.0 46.6 46.2   * 400 360     Recent Labs     09/06/21  0201 09/05/21  0215 09/04/21  0123    137 137   K 4.7 4.3 4.7    102 104   CO2 29 29 29   * 119* 130*   BUN 20 22* 22*   CREA 0.82 0.85 0.74   CA 8.8 8.9 8.6   ALB 2.6* 2.5* 2.5*   TBILI 0.9 0.5 0.7   * 156* 144*       Signed: Evi Giang MD

## 2021-09-06 NOTE — PROGRESS NOTES
0700: Bedside shift change report received from ALEXANDER Briggs (off going nurse) to Joann Huggins RN (oncoming nurse). Report included the following information: SBAR, Kardex, Intake/Output, MAR, Recent Results and Cardiac Rhythm. End of Shift Note      1900: Bedside shift change report given to Francisco blanco RN (oncoming nurse) by Joann Huggins RN (offgoing nurse). Report included the following information: Report included the following information: SBAR, Kardex, Intake/Output, MAR, Recent Results and Cardiac Rhythm. Shift worked:  [id number]     Shift summary and any significant changes:     None     Concerns for physician to address:       Zone phone for oncoming shift:          Activity:  Activity Level: Up with Assistance  Number times ambulated in hallways past shift: 0  Number of times OOB to chair past shift: 0    Neuro: WNL    Cardiac:   Cardiac Monitoring: Yes      Cardiac Rhythm: Sinus Rhythm    Access:   Current line(s): PIV     Genitourinary:   Urinary status: voiding    Respiratory:   O2 Device: Hi flow nasal cannula  Chronic home O2 use?: NO  Incentive spirometer at bedside: YES     GI:  Last Bowel Movement Date: 08/31/21  Current diet:  ADULT DIET Regular; Low Fat/Low Chol/High Fiber/KATIE  Passing flatus: YES  Tolerating current diet: YES       Pain Management:   Patient states pain is manageable on current regimen: YES    Skin:  Antonio Score: 19  Interventions: float heels, foam dressing, PT/OT consult, limit briefs, internal/external urinary devices, and nutritional support     Patient Safety:  Fall Score: Total Score: 3  Interventions: bed/chair alarm, gripper socks, and pt to call before getting OOB  High Fall Risk: Yes    Length of Stay:  Expected LOS: 4d 19h  Actual LOS: [facility], RN, [facility]                     Problem: Falls - Risk of  Goal: *Absence of Falls  Description: Document Marciana Distance Fall Risk and appropriate interventions in the flowsheet.   Outcome: Progressing Towards Goal  Note: Fall Risk Interventions:  Mobility Interventions: Bed/chair exit alarm, Communicate number of staff needed for ambulation/transfer, Patient to call before getting OOB, Strengthening exercises (ROM-active/passive)         Medication Interventions: Bed/chair exit alarm, Patient to call before getting OOB, Teach patient to arise slowly    Elimination Interventions: Bed/chair exit alarm, Call light in reach, Patient to call for help with toileting needs, Toilet paper/wipes in reach, Toileting schedule/hourly rounds, Urinal in reach              Problem: Patient Education: Go to Patient Education Activity  Goal: Patient/Family Education  Outcome: Progressing Towards Goal     Problem: Risk for Spread of Infection  Goal: Prevent transmission of infectious organism to others  Description: Prevent the transmission of infectious organisms to other patients, staff members, and visitors. Outcome: Progressing Towards Goal     Problem: Patient Education:  Go to Education Activity  Goal: Patient/Family Education  Outcome: Progressing Towards Goal     Problem: Airway Clearance - Ineffective  Goal: Achieve or maintain patent airway  Outcome: Progressing Towards Goal     Problem: Gas Exchange - Impaired  Goal: Absence of hypoxia  Outcome: Progressing Towards Goal  Goal: Promote optimal lung function  Outcome: Progressing Towards Goal     Problem: Breathing Pattern - Ineffective  Goal: Ability to achieve and maintain a regular respiratory rate  Outcome: Progressing Towards Goal     Problem:  Body Temperature -  Risk of, Imbalanced  Goal: Ability to maintain a body temperature within defined limits  Outcome: Progressing Towards Goal  Goal: Will regain or maintain usual level of consciousness  Outcome: Progressing Towards Goal  Goal: Complications related to the disease process, condition or treatment will be avoided or minimized  Outcome: Progressing Towards Goal     Problem: Isolation Precautions - Risk of Spread of Infection  Goal: Prevent transmission of infectious organism to others  Outcome: Progressing Towards Goal     Problem: Nutrition Deficits  Goal: Optimize nutrtional status  Outcome: Progressing Towards Goal     Problem: Risk for Fluid Volume Deficit  Goal: Maintain normal heart rhythm  Outcome: Progressing Towards Goal  Goal: Maintain absence of muscle cramping  Outcome: Progressing Towards Goal  Goal: Maintain normal serum potassium, sodium, calcium, phosphorus, and pH  Outcome: Progressing Towards Goal     Problem: Loneliness or Risk for Loneliness  Goal: Demonstrate positive use of time alone when socialization is not possible  Outcome: Progressing Towards Goal     Problem: Fatigue  Goal: Verbalize increase energy and improved vitality  Outcome: Progressing Towards Goal     Problem: Patient Education: Go to Patient Education Activity  Goal: Patient/Family Education  Outcome: Progressing Towards Goal

## 2021-09-06 NOTE — PROGRESS NOTES
Problem: Falls - Risk of  Goal: *Absence of Falls  Description: Document Nato Trjeo Fall Risk and appropriate interventions in the flowsheet.   Outcome: Progressing Towards Goal  Note: Fall Risk Interventions:  Mobility Interventions: Bed/chair exit alarm, Assess mobility with egress test, Patient to call before getting OOB         Medication Interventions: Bed/chair exit alarm, Assess postural VS orthostatic hypotension, Patient to call before getting OOB    Elimination Interventions: Call light in reach, Bed/chair exit alarm, Patient to call for help with toileting needs

## 2021-09-07 LAB
ALBUMIN SERPL-MCNC: 2.7 G/DL (ref 3.5–5)
ALBUMIN/GLOB SERPL: 0.6 {RATIO} (ref 1.1–2.2)
ALP SERPL-CCNC: 50 U/L (ref 45–117)
ALT SERPL-CCNC: 126 U/L (ref 12–78)
ANION GAP SERPL CALC-SCNC: 1 MMOL/L (ref 5–15)
AST SERPL-CCNC: 32 U/L (ref 15–37)
BILIRUB SERPL-MCNC: 0.8 MG/DL (ref 0.2–1)
BUN SERPL-MCNC: 23 MG/DL (ref 6–20)
BUN/CREAT SERPL: 22 (ref 12–20)
CALCIUM SERPL-MCNC: 8.8 MG/DL (ref 8.5–10.1)
CHLORIDE SERPL-SCNC: 101 MMOL/L (ref 97–108)
CO2 SERPL-SCNC: 34 MMOL/L (ref 21–32)
CREAT SERPL-MCNC: 1.06 MG/DL (ref 0.7–1.3)
CRP SERPL-MCNC: <0.29 MG/DL (ref 0–0.6)
D DIMER PPP FEU-MCNC: 7.79 MG/L FEU (ref 0–0.65)
ERYTHROCYTE [DISTWIDTH] IN BLOOD BY AUTOMATED COUNT: 12.3 % (ref 11.5–14.5)
GLOBULIN SER CALC-MCNC: 4.2 G/DL (ref 2–4)
GLUCOSE SERPL-MCNC: 104 MG/DL (ref 65–100)
HCT VFR BLD AUTO: 46.9 % (ref 36.6–50.3)
HGB BLD-MCNC: 15.3 G/DL (ref 12.1–17)
MCH RBC QN AUTO: 28.7 PG (ref 26–34)
MCHC RBC AUTO-ENTMCNC: 32.6 G/DL (ref 30–36.5)
MCV RBC AUTO: 88 FL (ref 80–99)
NRBC # BLD: 0 K/UL (ref 0–0.01)
NRBC BLD-RTO: 0 PER 100 WBC
PLATELET # BLD AUTO: 398 K/UL (ref 150–400)
PMV BLD AUTO: 9.1 FL (ref 8.9–12.9)
POTASSIUM SERPL-SCNC: 4.6 MMOL/L (ref 3.5–5.1)
PROT SERPL-MCNC: 6.9 G/DL (ref 6.4–8.2)
RBC # BLD AUTO: 5.33 M/UL (ref 4.1–5.7)
SODIUM SERPL-SCNC: 136 MMOL/L (ref 136–145)
WBC # BLD AUTO: 10 K/UL (ref 4.1–11.1)

## 2021-09-07 PROCEDURE — 97535 SELF CARE MNGMENT TRAINING: CPT

## 2021-09-07 PROCEDURE — 86140 C-REACTIVE PROTEIN: CPT

## 2021-09-07 PROCEDURE — 65270000029 HC RM PRIVATE

## 2021-09-07 PROCEDURE — 36415 COLL VENOUS BLD VENIPUNCTURE: CPT

## 2021-09-07 PROCEDURE — 85379 FIBRIN DEGRADATION QUANT: CPT

## 2021-09-07 PROCEDURE — 97165 OT EVAL LOW COMPLEX 30 MIN: CPT

## 2021-09-07 PROCEDURE — 74011250636 HC RX REV CODE- 250/636: Performed by: INTERNAL MEDICINE

## 2021-09-07 PROCEDURE — 85027 COMPLETE CBC AUTOMATED: CPT

## 2021-09-07 PROCEDURE — 74011250637 HC RX REV CODE- 250/637: Performed by: INTERNAL MEDICINE

## 2021-09-07 PROCEDURE — 80053 COMPREHEN METABOLIC PANEL: CPT

## 2021-09-07 PROCEDURE — 77010033711 HC HIGH FLOW OXYGEN

## 2021-09-07 PROCEDURE — 97530 THERAPEUTIC ACTIVITIES: CPT | Performed by: PHYSICAL THERAPIST

## 2021-09-07 RX ADMIN — DEXAMETHASONE SODIUM PHOSPHATE 6 MG: 4 INJECTION, SOLUTION INTRAMUSCULAR; INTRAVENOUS at 16:27

## 2021-09-07 RX ADMIN — ENOXAPARIN SODIUM 40 MG: 40 INJECTION SUBCUTANEOUS at 09:30

## 2021-09-07 RX ADMIN — POLYETHYLENE GLYCOL 3350 17 G: 17 POWDER, FOR SOLUTION ORAL at 09:30

## 2021-09-07 RX ADMIN — Medication 10 ML: at 16:28

## 2021-09-07 RX ADMIN — Medication 10 ML: at 23:04

## 2021-09-07 NOTE — PROGRESS NOTES
Problem: Mobility Impaired (Adult and Pediatric)  Goal: *Acute Goals and Plan of Care (Insert Text)  Description: FUNCTIONAL STATUS PRIOR TO ADMISSION: Pt lives with wife in one story home. He is fully independent at baseline without device and works in [employer] as . HOME SUPPORT PRIOR TO ADMISSION: The patient lived with wife but did not require assist.    Physical Therapy Goals  Initiated 9/1/2021  1. Patient will move from supine to sit and sit to supine , scoot up and down, and roll side to side in bed with independence within 7 day(s). 2.  Patient will transfer from bed to chair and chair to bed with independence using the least restrictive device within 7 day(s). 3.  Patient will perform sit to stand with independence within 7 day(s). 4.  Patient will ambulate with independence for 150 feet with the least restrictive device within 7 day(s). 5.  Patient will ascend/descend 4 stairs with handrail(s) with independence within 7 day(s). (unless unable due to precautions)      Outcome: Progressing Towards Goal   PHYSICAL THERAPY TREATMENT  Patient: George Garcia II (54 y.o. male)  Date: 9/7/2021  Diagnosis: Pneumonia due to COVID-19 virus [U07.1, J12.82] <principal problem not specified>       Precautions: Fall  Chart, physical therapy assessment, plan of care and goals were reviewed. ASSESSMENT  Patient continues with skilled PT services and is progressing towards goals. Patient overall limited by continued SOB, gait instability, and decreased activity tolerance. Currently needing supervision to come to EOB and CGA for transfers. Able to take a few steps to chair with CGA and increased time. He is overall steady with transfer but fatigues quickly. Difficulty checking SpO2 despite changing sensor. He appears visibly SOB but states that he does not feel SOB. Anticipate patient will be safe to DC home with [facility] PT vs no services pending progress. Will continue to follow. Other factors to consider for discharge: at risk for falls, below baseline         PLAN :  Patient continues to benefit from skilled intervention to address the above impairments. Continue treatment per established plan of care. to address goals. Recommendation for discharge: (in order for the patient to meet his/her long term goals)  Physical therapy at least 2 days/week in the home vs none pending progress        IF patient discharges home will need the following DME: to be determined (TBD)       SUBJECTIVE:   Patient stated I don't really feel short of breath.     OBJECTIVE DATA SUMMARY:   Critical Behavior:  Neurologic State: Alert  Orientation Level: Oriented X4  Cognition: Appropriate decision making, Appropriate for age attention/concentration, Follows commands  Safety/Judgement: Awareness of environment, Fall prevention  Functional Mobility Training:  Bed Mobility:  Rolling: Supervision  Supine to Sit: Supervision     Scooting: Supervision        Transfers:  Sit to Stand: Stand-by assistance  Stand to Sit: Stand-by assistance        Bed to Chair: Contact guard assistance                    Balance:  Sitting: Intact  Standing: Impaired  Standing - Static: Good  Standing - Dynamic : Fair  Ambulation/Gait Training:  Distance (ft): 5 Feet (ft)     Ambulation - Level of Assistance: Contact guard assistance        Gait Abnormalities: Decreased step clearance;Shuffling gait        Base of Support: Center of gravity altered;Narrowed     Speed/Mercy: Pace decreased (<100 feet/min); Slow  Step Length: Left shortened;Right shortened          Pain Rating:  No c/o pain    Activity Tolerance:   Fair and requires rest breaks    After treatment patient left in no apparent distress:   Sitting in chair and Call bell within reach    COMMUNICATION/COLLABORATION:   The patients plan of care was discussed with: Physical therapist, Occupational therapist, and Registered nurse.      Katharina Espinosa, PT, DPT   Time Calculation: 15 mins

## 2021-09-07 NOTE — PROGRESS NOTES
Problem: Self Care Deficits Care Plan (Adult)  Goal: *Acute Goals and Plan of Care (Insert Text)  Description:   FUNCTIONAL STATUS PRIOR TO ADMISSION: Patient was independent and active without use of DME. Patient was independent for basic and instrumental ADLs. HOME SUPPORT: The patient lived with family. Occupational Therapy Goals  Initiated 9/7/2021  1. Patient will perform grooming with modified independence within 7 day(s). 2.  Patient will perform upper body dressing with modified independence within 7 day(s). 3.  Patient will perform lower body dressing with modified independence within 7 day(s). 4.  Patient will perform toilet transfers with modified independence within 7 day(s). 5.  Patient will perform all aspects of toileting with modified independence within 7 day(s). 6.  Patient will participate in upper extremity therapeutic exercise/activities with modified independence for 5 minutes within 7 day(s). 7.  Patient will utilize energy conservation techniques, pacing strategies, and pursed lipped breathing during functional activities with verbal cues within 7 day(s). Outcome: Not Met   OCCUPATIONAL THERAPY EVALUATION  Patient: Sima Allen II [de-identified]48 y.o. male)  Date: 9/7/2021  Primary Diagnosis: Pneumonia due to COVID-19 virus [U07.1, J12.82]        Precautions: Fall    ASSESSMENT  Based on the objective data described below, the patient presents with decreased independence in self-care and functional mobility secondary to change in respiratory status, impaired balance, general weakness, and decreased activity tolerance. Patient is functioning below his independent baseline for self-care and functional mobility, now completing self-care with independence to min assist and functional mobility with supervision to contact guard assist. Patient received semisupine in bed on 6L HF O2 and agreeable for therapy.  Patient completed supine > sit with supervision and demonstrated intact sitting balance. Patient stood with stand-by assist and took several small steps to chair with contact guard assist. Once seated, patient was able to complete LB dressing with contact guard assist. Patient noted to have shortness of breath during dressing activity and educated on pursed lipped breathing. Initially unable to obtain SPO2 reading secondary to sensor not able to read but appeared normal once sensor changed. Patient was left sitting in chair with all needs met and VSS. Patient would benefit from skilled OT services during acute hospital stay. Anticipate patient would benefit from HHOT/PT services during acute hospital stay, pending progress. Current Level of Function Impacting Discharge (ADLs/self-care): independent to min assist for self-care, supervision to contact guard assist for functional mobility     Functional Outcome Measure: The patient scored 55 on the Barthel Index outcome measure which is indicative of 45% functional impairment in ADLs. Other factors to consider for discharge: fall risk, covid +     Patient will benefit from skilled therapy intervention to address the above noted impairments. PLAN :  Recommendations and Planned Interventions: self care training, functional mobility training, therapeutic exercise, balance training, therapeutic activities, endurance activities, patient education, home safety training, and family training/education    Frequency/Duration: Patient will be followed by occupational therapy 3 times a week to address goals. Recommendation for discharge: (in order for the patient to meet his/her long term goals)  Occupational therapy at least 2 days/week in the home     This discharge recommendation:  Has not yet been discussed the attending provider and/or case management    IF patient discharges home will need the following DME: TBD pending progress       SUBJECTIVE:   Patient stated I'm a little short of breath.     OBJECTIVE DATA SUMMARY:   HISTORY: Past Medical History:   Diagnosis Date    No pertinent past medical history    No past surgical history on file. Expanded or extensive additional review of patient history:     Home Situation  Home Environment: Private residence  # Steps to Enter: 4  Rails to Enter: Yes  One/Two Story Residence: One story  Living Alone: No  Support Systems: Spouse/Significant Other/Partner  Current DME Used/Available at Home: None  Tub or Shower Type: Tub/Shower combination    Hand dominance: Right    EXAMINATION OF PERFORMANCE DEFICITS:  Cognitive/Behavioral Status:  Neurologic State: Alert  Orientation Level: Oriented X4  Cognition: Appropriate decision making; Appropriate for age attention/concentration; Follows commands  Perception: Appears intact  Perseveration: No perseveration noted  Safety/Judgement: Awareness of environment; Fall prevention    Hearing: Auditory  Auditory Impairment: None    Vision/Perceptual:     Acuity: Within Defined Limits      Range of Motion:  AROM: Within functional limits    Strength:  Strength: Generally decreased, functional    Coordination:  Coordination: Within functional limits  Fine Motor Skills-Upper: Left Intact; Right Intact    Gross Motor Skills-Upper: Left Intact; Right Intact    Tone & Sensation:  Tone: Normal  Sensation: Intact    Balance:  Sitting: Intact  Standing: Impaired  Standing - Static: Good  Standing - Dynamic : Fair    Functional Mobility and Transfers for ADLs:  Bed Mobility:  Rolling: Supervision  Supine to Sit: Supervision  Scooting: Supervision    Transfers:  Sit to Stand: Stand-by assistance  Stand to Sit: Stand-by assistance  Bed to Chair: Contact guard assistance    ADL Assessment:  Feeding: Independent  Oral Facial Hygiene/Grooming: Setup;Supervision (seated)  Bathing: Minimum assistance  Upper Body Dressing: Setup;Supervision  Lower Body Dressing: Contact guard assistance  Toileting: Minimum assistance    ADL Intervention and task modifications:    Lower Body Dressing Assistance  Socks: Contact guard assistance  Leg Crossed Method Used: No  Position Performed: Bending forward method;Seated edge of bed  Cues: Doff;Don;Tactile cues provided;Verbal cues provided    Cognitive Retraining  Safety/Judgement: Awareness of environment; Fall prevention    Functional Measure:  Barthel Index:    Bathin  Bladder: 10  Bowels: 10  Groomin  Dressin  Feeding: 10  Mobility: 0  Stairs: 0  Toilet Use: 5  Transfer (Bed to Chair and Back): 10  Total: 55/100        The Barthel ADL Index: Guidelines  1. The index should be used as a record of what a patient does, not as a record of what a patient could do. 2. The main aim is to establish degree of independence from any help, physical or verbal, however minor and for whatever reason. 3. The need for supervision renders the patient not independent. 4. A patient's performance should be established using the best available evidence. Asking the patient, friends/relatives and nurses are the usual sources, but direct observation and common sense are also important. However direct testing is not needed. 5. Usually the patient's performance over the preceding 24-48 hours is important, but occasionally longer periods will be relevant. 6. Middle categories imply that the patient supplies over 50 per cent of the effort. 7. Use of aids to be independent is allowed. Vandana Gaviria., Barthel, D.W. (3538). Functional evaluation: the Barthel Index. [facility] (14)2. JEREL Carr, Jackson Beavers., Hernan Montejo., Amber Ledezma, [address] [place] [address] (). Measuring the change indisability after inpatient rehabilitation; comparison of the responsiveness of the Barthel Index and Functional [place] Measure. Journal of Neurology, Neurosurgery, and Psychiatry, 66(4), 942-327. Kelley Crane, N.J.A, Ambika Young,  TAWNYA.J.M, & Tootie Angulo, M.A. (2004.) Assessment of post-stroke quality of life in cost-effectiveness studies:  The usefulness of the Barthel Index and the EuroQoL-5D. Quality of Life Research, 13, 375-57     Based on the above components, the patient evaluation is determined to be of the following complexity level: LOW   Pain Rating:  Patient did not c/o pain during session. Activity Tolerance:   Fair, desaturates with exertion and requires oxygen, and requires rest breaks    After treatment patient left in no apparent distress:    Sitting in chair and Call bell within reach    COMMUNICATION/EDUCATION:   The patients plan of care was discussed with: Physical therapist and Registered nurse. Home safety education was provided and the patient/caregiver indicated understanding., Patient/family have participated as able in goal setting and plan of care. , and Patient/family agree to work toward stated goals and plan of care. This patients plan of care is appropriate for delegation to \[facility] [place] [facility]\"".     Thank you for this referral.  Nataly Alegria OTR/L  Time Calculation: 15 mins

## 2021-09-07 NOTE — PROGRESS NOTES
Pulmonary, Critical Care, and Sleep Medicine    Name: Megan Garcias II MRN: [id number]   : 1965 Hospital: [facility]   Date: 2021        IMPRESSION:   · Acute hypoxic respiratory failure  · COVID-19 pneumonia, unvaccinated  · COVID associated gastroenteritis      RECOMMENDATIONS:   · O2 - currently on 8 LPM  · Dexamethasone  · Mobilize  · Incentive spirometry  · proning  · Outside of window for remdesivir per hospital system criteria  · CRP lower, does not qualify for tocilizumab at this time  · DVT prophylaxis     Subjective:     21: events of weekend reviewed; No diarrhea. Cough with occasional yellow sputum? O2 requirements went as high as 13 LPM, but currently weaned to 8 LPM. Using incentive spirometer. Sleeping on sides? .     9-3-21: O2 requirements went as high as 13 LPM, but currently weaned to 8 LPM.  Resting comfortably. 21: required more O2 overnight, now up to 11 LPM.  Dyspnea about the same. Initial history: This patient has been seen and evaluated at the request of Dr. Michelle Lopez for [facility]. Patient is a 48 y.o. unvaccinated male admitted overnight with COVID pneumonia. He has had 10 days of coughing and progressive dyspnea. Some diarrhea as well. Found to be hypoxic on admission. Since admission he has been weaned from 13 LPM down to 6 LPM.       Past Medical History:   Diagnosis Date    No pertinent past medical history       No past surgical history on file. Prior to Admission medications    Not on File     No Known Allergies   Social History     Tobacco Use    Smoking status: Never Smoker    Smokeless tobacco: Never Used   Substance Use Topics    Alcohol use: Never      No family history on file.      Current Facility-Administered Medications   Medication Dose Route Frequency    dexamethasone (DECADRON) 4 mg/mL injection 6 mg  6 mg IntraVENous Q24H    sodium chloride (NS) flush 5-40 mL  5-40 mL IntraVENous Q8H    polyethylene glycol (MIRALAX) packet 17 g  17 g Oral DAILY    enoxaparin (LOVENOX) injection 40 mg  40 mg SubCUTAneous Q24H       Review of Systems:  A comprehensive review of systems was negative except for that written in the HPI. Objective:   Vital Signs:    Visit Vitals  /80 (BP 1 Location: Right arm, BP Patient Position: At rest)   Pulse (!) 59   Temp 98.6 °F (37 °C)   Resp 17   Ht 5' 8\" (1.727 m)   Wt 83.7 kg (184 lb 9.6 oz)   SpO2 97%   BMI 28.07 kg/m²       O2 Device: Hi flow nasal cannula   O2 Flow Rate (L/min): 6 l/min   Temp (24hrs), Av.5 °F (36.9 °C), Min:98.4 °F (36.9 °C), Max:98.7 °F (37.1 °C)       Intake/Output:   Last shift:      No intake/output data recorded. Last 3 shifts:  1901 -  0700  In: 760 [P.O.:760]  Out: 1570 [Urine:1570]    Intake/Output Summary (Last 24 hours) at 2021 0840  Last data filed at 2021 1800  Gross per 24 hour   Intake 520 ml   Output 850 ml   Net -330 ml      Physical Exam:   General:  Alert, ill appearing, no distress   Head:  Normocephalic, without obvious abnormality, atraumatic. Eyes:  Conjunctivae/corneas clear. Nose: Nares normal. Septum midline. Mucosa normal.    Throat: Lips, mucosa, and tongue normal.     Neck: Supple, symmetrical, trachea midline    Lungs:   Distant bilateral breath sounds   Chest wall:  No tenderness or deformity. Heart:  Regular rate and rhythm    Abdomen:   Soft, non-tender.  Bowel sounds normal.    Extremities: Extremities normal, atraumatic, no cyanosis    Skin: Skin color, texture, turgor normal. No rashes or lesions   Neurologic: Grossly nonfocal     Data:   Labs:  Recent Labs     21  0405 21  0201 21  0215   WBC 10.0 8.5 7.1   HGB 15.3 15.2 15.0   HCT 46.9 46.0 46.6    424* 400     Recent Labs     21  0405 21  0201 21  0215    136 137   K 4.6 4.7 4.3    102 102   CO2 34* 29 29   * 121* 119*   BUN 23* 20 22*   CREA 1.06 0.82 0.85   CA 8.8 8.8 8.9   ALB 2.7* 2. 6* 2.5*   TBILI 0.8 0.9 0.5   * 145* 156*     No results for input(s): PHI, PCO2I, PO2I, HCO3I, FIO2I in the last 72 hours.     Imaging:  I have personally reviewed the patients radiographs:  None today        David New MD

## 2021-09-07 NOTE — PROGRESS NOTES
1900: shift report given by Jd Monsalve, RN pt and vss  0700: End of Shift Note    Bedside shift change report given  by Jose Angel Domingo (offgoing nurse). Report included the following information SBAR, Kardex, Intake/Output, MAR, Recent Results and Cardiac Rhythm NSR    Shift worked:  [id number]     Shift summary and any significant changes:     none at this time      Concerns for physician to address:  no     Zone phone for oncoming shift:   [phone]       Activity:  Activity Level: Up with Assistance  Number times ambulated in hallways past shift: 0  Number of times OOB to chair past shift: 0    Cardiac:   Cardiac Monitoring: Yes      Cardiac Rhythm: Sinus Rhythm    Access:   Current line(s): PIV     Genitourinary:   Urinary status: voiding    Respiratory:   O2 Device: Hi flow nasal cannula  Chronic home O2 use?: NO  Incentive spirometer at bedside: YES     GI:  Last Bowel Movement Date: 08/31/21  Current diet:  ADULT DIET Regular; Low Fat/Low Chol/High Fiber/KATIE  Passing flatus: YES  Tolerating current diet: YES       Pain Management:   Patient states pain is manageable on current regimen: YES    Skin:  Antonio Score: 20  Interventions:     Patient Safety:  Fall Score:  Total Score: 3  Interventions: bed/chair alarm, assistive device (walker, cane, etc), gripper socks and pt to call before getting OOB  High Fall Risk: Yes    Length of Stay:  Expected LOS: 4d 19h  Actual LOS: [address]

## 2021-09-07 NOTE — PROGRESS NOTES
Hospitalist Progress Note    NAME: Antonio Barroso II   :  1965   MRN:  [id number]       Assessment / Plan:  Acute respiratory failure with hypoxia POA due to  COVID-19 pneumonia POA causing  Sepsis POA     Tachycardia, fever and tachypnea on presentation  Presents with 10 days of cough and increasing SOB  Unvaccinated  Improved to 5 liters oxgyen  C/w IV dexamethasone  Didn't qualify for Remdesivir per hospital protocol as her symptoms began 10 days back  F/u  inflammatory markers  C/w Emperic Abx  CRP low so doesn't qualify for Tocilizumab  Pulmonary following  Xanax started for anxiety  Can be transferred to tele      Obesity POA Body mass index is 30.41 kg/m².     DVT prophylaxis with lovenox     Code status: full code  NOK: wife  Updated wife Guanaco Zee over the phone , requesting for regular update     Estimated discharge date: 2-3 days  Barriers: If continues to improve on oxygen requirement     Subjective:     Chief Complaint / Reason for Physician Visit  Follow up for covid 19  He is on 5 liters oxygen    Review of Systems:  Symptom Y/N Comments  Symptom Y/N Comments   Fever/Chills n   Chest Pain     Poor Appetite    Edema     Cough y   Abdominal Pain     Sputum y   Joint Pain     SOB/VIRK y   Pruritis/Rash     Nausea/vomit    Tolerating PT/OT     Diarrhea    Tolerating Diet     Constipation    Other       Could NOT obtain due to:      Objective:     VITALS:   Last 24hrs VS reviewed since prior progress note.  Most recent are:  Patient Vitals for the past 24 hrs:   Temp Pulse Resp BP SpO2   21 1046 98.6 °F (37 °C) 95 28 134/75 95 %   21 0806     97 %   21 0756 98.5 °F (36.9 °C) 67 23 97/68 97 %   21 0336 98.6 °F (37 °C) (!) 59 17 111/80 100 %   21 2306 98.6 °F (37 °C) 74 19 106/70 97 %   21 1940 98.4 °F (36.9 °C) 76 18 115/70 98 %   21 1525 98.4 °F (36.9 °C) 73 19 118/74 97 %       Intake/Output Summary (Last 24 hours) at 2021 1520  Last data filed at 9/6/2021 1800  Gross per 24 hour   Intake 120 ml   Output 550 ml   Net -430 ml        I had a face to face encounter and independently examined this patient on 9/7/2021, as outlined below:  PHYSICAL EXAM:  General: WD, WN. Alert, cooperative, no acute distress    EENT:  EOMI. Anicteric sclerae. MMM  Resp:  CTA bilaterally, no wheezing or rales. No accessory muscle use  CV:  Regular  rhythm,  No edema  GI:  Soft, Non distended, Non tender. +Bowel sounds  Neurologic:  Alert and oriented X 3, normal speech,   Psych:   Good insight. Not anxious nor agitated  Skin:  No rashes. No jaundice    Reviewed most current lab test results and cultures  YES  Reviewed most current radiology test results   YES  Review and summation of old records today    NO  Reviewed patient's current orders and MAR    YES  PMH/ reviewed - no change compared to H&P  ________________________________________________________________________  Care Plan discussed with:    Comments   Patient y    Family      RN y    Care Manager     Consultant                        Multidiciplinary team rounds were held today with , nursing, pharmacist and clinical coordinator. Patient's plan of care was discussed; medications were reviewed and discharge planning was addressed. ________________________________________________________________________  Total NON critical care TIME: 35   Minutes    Total CRITICAL CARE TIME Spent:   Minutes non procedure based      Comments   >50% of visit spent in counseling and coordination of care     ________________________________________________________________________  Umu Kelsey MD     Procedures: see electronic medical records for all procedures/Xrays and details which were not copied into this note but were reviewed prior to creation of Plan. LABS:  I reviewed today's most current labs and imaging studies.   Pertinent labs include:  Recent Labs     09/07/21  0405 09/06/21  0201 09/05/21  8896 WBC 10.0 8.5 7.1   HGB 15.3 15.2 15.0   HCT 46.9 46.0 46.6    424* 400     Recent Labs     09/07/21  0405 09/06/21  0201 09/05/21  0215    136 137   K 4.6 4.7 4.3    102 102   CO2 34* 29 29   * 121* 119*   BUN 23* 20 22*   CREA 1.06 0.82 0.85   CA 8.8 8.8 8.9   ALB 2.7* 2.6* 2.5*   TBILI 0.8 0.9 0.5   * 145* 156*       Signed: Rg Lyle MD

## 2021-09-07 NOTE — PROGRESS NOTES
Transition of Care Plan:     RUR: 11  Disposition: Home with Op services  Follow up appointments: PCP  DME needed: Pt is independent. No required  Transportation at Discharge: Spouse will transport   [address] or means to access home:  Spouse has access to key       IM Medicare Letter: NA  Is patient a BCPI-A Bundle:      NA               If yes, was Bundle Letter given?:     Caregiver Contact: Bandarberkley Gonzalezaman [phone]  Discharge Caregiver contacted prior to discharge? No, will contact upon d/c. Patient continues to be monitored on pcu. He is currently on 6 liters oxygen and nursing to continue to wean. Will continue to monitor. Frances Randle RN BSN CRM        [phone]

## 2021-09-07 NOTE — PROGRESS NOTES
Spiritual Care Assessment/Progress Note  [facility]      NAME: Kendell Irwin II      MRN: [id number]  AGE: 48 y.o.  SEX: male  Taoism Affiliation: Unknown   Language: English     9/7/2021     Total Time (in minutes): 22     Spiritual Assessment begun in [facility] PROGRESSIVE CARE through conversation with:         [x]Patient        [] Family    [] Friend(s)        Reason for Consult: Initial/Spiritual assessment, patient floor     Spiritual beliefs: (Please include comment if needed)     [] Identifies with a nataly tradition:         [] Supported by a nataly community:            [x] Claims no spiritual orientation:           [] Seeking spiritual identity:                [] Adheres to an individual form of spirituality:           [] Not able to assess:                           Identified resources for coping:      [] Prayer                               [] Music                  [] Guided Imagery     [x] Family/friends                 [] Pet visits     [] Devotional reading                         [] Unknown     [] Other:                                              Interventions offered during this visit: (See comments for more details)    Patient Interventions: Affirmation of emotions/emotional suffering, Catharsis/review of pertinent events in supportive environment, Coping skills reviewed/reinforced, Initial/Spiritual assessment, Critical care, Life review/legacy           Plan of Care:     [] Support spiritual and/or cultural needs    [] Support AMD and/or advance care planning process      [] Support grieving process   [] Coordinate Rites and/or Rituals    [] Coordination with community clergy   [x] No spiritual needs identified at this time   [] Detailed Plan of Care below (See Comments)  [] Make referral to Music Therapy  [] Make referral to Pet Therapy     [] Make referral to Addiction services  [] Make referral to [facility]  [] Make referral to Spiritual Care Partner  [] No future visits requested        [x] Follow up upon further referrals     Comments: Provided support for this pt in [facility] via telephone Care Call due to droplet restrictions. Reviewed pt's chart prior to this visit to augment any observed or expressed support needs this pt may have. Facilitated life review to assess potential support needs or coping strategies. Pt offered review of current situation. Pt is coping with this admission by constant conversation with wife and keeping a focus on getting better and working to discharge. Pt shared his desire to return to his career of 23 years with [employer].  [address] inquired about pt's Muslim affiliation and pt confirmed there is no designation at this time. Pt values family support. Pt didn't express any emotional or spiritual support needs at this time. Affirmed ongoing availability of support. Dilip Martinez MDiv.  Staff   Request  Support/Spiritual Care Services via [facility]

## 2021-09-08 PROCEDURE — 77010033711 HC HIGH FLOW OXYGEN

## 2021-09-08 PROCEDURE — 74011250637 HC RX REV CODE- 250/637: Performed by: INTERNAL MEDICINE

## 2021-09-08 PROCEDURE — 77030027138 HC INCENT SPIROMETER -A

## 2021-09-08 PROCEDURE — 94640 AIRWAY INHALATION TREATMENT: CPT

## 2021-09-08 PROCEDURE — 74011250636 HC RX REV CODE- 250/636: Performed by: INTERNAL MEDICINE

## 2021-09-08 PROCEDURE — 65270000029 HC RM PRIVATE

## 2021-09-08 PROCEDURE — 2709999900 HC NON-CHARGEABLE SUPPLY

## 2021-09-08 PROCEDURE — 74011250637 HC RX REV CODE- 250/637: Performed by: STUDENT IN AN ORGANIZED HEALTH CARE EDUCATION/TRAINING PROGRAM

## 2021-09-08 PROCEDURE — 94760 N-INVAS EAR/PLS OXIMETRY 1: CPT

## 2021-09-08 RX ORDER — ALBUTEROL SULFATE 90 UG/1
2 AEROSOL, METERED RESPIRATORY (INHALATION)
Status: DISCONTINUED | OUTPATIENT
Start: 2021-09-08 | End: 2021-09-10 | Stop reason: HOSPADM

## 2021-09-08 RX ADMIN — Medication 10 ML: at 21:10

## 2021-09-08 RX ADMIN — Medication 10 ML: at 15:26

## 2021-09-08 RX ADMIN — ALBUTEROL SULFATE 2 PUFF: 90 AEROSOL, METERED RESPIRATORY (INHALATION) at 10:40

## 2021-09-08 RX ADMIN — ENOXAPARIN SODIUM 40 MG: 40 INJECTION SUBCUTANEOUS at 08:44

## 2021-09-08 RX ADMIN — ALBUTEROL SULFATE 2 PUFF: 90 AEROSOL, METERED RESPIRATORY (INHALATION) at 12:57

## 2021-09-08 RX ADMIN — DEXAMETHASONE SODIUM PHOSPHATE 6 MG: 4 INJECTION, SOLUTION INTRAMUSCULAR; INTRAVENOUS at 15:26

## 2021-09-08 RX ADMIN — ALBUTEROL SULFATE 2 PUFF: 90 AEROSOL, METERED RESPIRATORY (INHALATION) at 15:36

## 2021-09-08 RX ADMIN — ALBUTEROL SULFATE 2 PUFF: 90 AEROSOL, METERED RESPIRATORY (INHALATION) at 20:17

## 2021-09-08 RX ADMIN — Medication 10 ML: at 05:19

## 2021-09-08 RX ADMIN — POLYETHYLENE GLYCOL 3350 17 G: 17 POWDER, FOR SOLUTION ORAL at 08:44

## 2021-09-08 NOTE — PROGRESS NOTES
Hospitalist Progress Note    NAME: Anny Piedra II   :  1965   MRN:  [id number]       Assessment / Plan:  Acute respiratory failure with hypoxia POA due to  COVID-19 pneumonia POA causing  Sepsis POA     Shortness of breath slightly better, wean down to 4 L nasal cannula today. C/w IV dexamethasone  Didn't qualify for Remdesivir per hospital protocol as her symptoms began 10 days back  F/u  inflammatory markers  C/w Emperic Abx  CRP low so doesn't qualify for Tocilizumab  Pulmonary following  Xanax started for anxiety  Can be transferred to tele      Obesity POA Body mass index is 30.41 kg/m².     DVT prophylaxis with lovenox     Code status: full code  NOK: wife  Updated wife Conception Po over the phone , requesting for regular update     Estimated discharge date: 2-3 days  Barriers: If continues to improve on oxygen requirement     Subjective:     Chief Complaint / Reason for Physician Visit  Patient seen today, doing fine, feeling better than yesterday still having some shortness of breath along with cough. Review of Systems:  Symptom Y/N Comments  Symptom Y/N Comments   Fever/Chills n   Chest Pain     Poor Appetite    Edema     Cough y   Abdominal Pain     Sputum y   Joint Pain     SOB/VIRK y   Pruritis/Rash     Nausea/vomit    Tolerating PT/OT     Diarrhea    Tolerating Diet     Constipation    Other       Could NOT obtain due to:      Objective:     VITALS:   Last 24hrs VS reviewed since prior progress note.  Most recent are:  Patient Vitals for the past 24 hrs:   Temp Pulse Resp BP SpO2   21 1154 98.1 °F (36.7 °C) 87 20 116/72 95 %   21 0830 98.2 °F (36.8 °C) 84 20 108/69 97 %   21 0431     95 %   21 0411 99 °F (37.2 °C) 90 20 128/80 94 %   21 2241 100 °F (37.8 °C) 91 20 125/87 94 %   21 2115     90 %   21 98.4 °F (36.9 °C) 84 25 120/81 (!) 87 %   21 1515 98.6 °F (37 °C) 98 25 116/74 90 %       Intake/Output Summary (Last 24 hours) at 9/8/2021 1308  Last data filed at 9/8/2021 1157  Gross per 24 hour   Intake 0 ml   Output 550 ml   Net -550 ml        I had a face to face encounter and independently examined this patient on 9/8/2021, as outlined below:  PHYSICAL EXAM:  General: WD, WN. Alert, cooperative, no acute distress    EENT:  EOMI. Anicteric sclerae. MMM  Resp:  Decreased breath sounds bilaterally, no wheezing or rales. No accessory muscle use  CV:  Regular  rhythm,  No edema  GI:  Soft, Non distended, Non tender. +Bowel sounds  Neurologic:  Alert and oriented X 3, normal speech,   Psych:   Good insight. Not anxious nor agitated  Skin:  No rashes. No jaundice    Reviewed most current lab test results and cultures  YES  Reviewed most current radiology test results   YES  Review and summation of old records today    NO  Reviewed patient's current orders and MAR    YES  PMH/SH reviewed - no change compared to H&P  ________________________________________________________________________  Care Plan discussed with:    Comments   Patient y    Family      RN y    Care Manager     Consultant                       y Multidiciplinary team rounds were held today with , nursing, pharmacist and clinical coordinator. Patient's plan of care was discussed; medications were reviewed and discharge planning was addressed. ________________________________________________________________________  Total NON critical care TIME: 35   Minutes    Total CRITICAL CARE TIME Spent:   Minutes non procedure based      Comments   >50% of visit spent in counseling and coordination of care     ________________________________________________________________________  Rick Vazquez MD     Procedures: see electronic medical records for all procedures/Xrays and details which were not copied into this note but were reviewed prior to creation of Plan. LABS:  I reviewed today's most current labs and imaging studies.   Pertinent labs include:  Recent Labs 09/07/21  0405 09/06/21  0201   WBC 10.0 8.5   HGB 15.3 15.2   HCT 46.9 46.0    424*     Recent Labs     09/07/21  0405 09/06/21  0201    136   K 4.6 4.7    102   CO2 34* 29   * 121*   BUN 23* 20   CREA 1.06 0.82   CA 8.8 8.8   ALB 2.7* 2.6*   TBILI 0.8 0.9   * 145*       Signed: Jazmine Molina MD

## 2021-09-08 NOTE — PROGRESS NOTES
End of Shift Note    Bedside shift change report given to Maureen Leigh (oncoming nurse) by Artur Shi RN (offgoing nurse). Report included the following information SBAR, Kardex and MAR    Shift worked:  7am-7pm     Shift summary and any significant changes:     uneventful shift. O2 currently at 5 liters via nasal cannula. Goal wean down to 4liters per md.     Concerns for physician to address:  none     Zone phone for oncoming shift:   [phone]       Activity:  Activity Level: Bed Rest  Number times ambulated in hallways past shift: 0  Number of times OOB to chair past shift: 0    Cardiac:   Cardiac Monitoring: Yes      Cardiac Rhythm: Sinus Rhythm    Access:   Current line(s): PIV     Genitourinary:   Urinary status: voiding    Respiratory:   O2 Device: Nasal cannula  Chronic home O2 use?: N/A  Incentive spirometer at bedside: YES  Actual Volume (ml): 750 ml  GI:  Last Bowel Movement Date: 08/31/21  Current diet:  ADULT DIET Regular; Low Fat/Low Chol/High Fiber/KATIE  Passing flatus: YES  Tolerating current diet: YES       Pain Management:   Patient states pain is manageable on current regimen: N/A    Skin:  Antonio Score: 20  Interventions: increase time out of bed    Patient Safety:  Fall Score:  Total Score: 3  Interventions: gripper socks  High Fall Risk: Yes    Length of Stay:  Expected LOS: 4d 19h  Actual LOS: [address] Rascon RN

## 2021-09-08 NOTE — PROGRESS NOTES
End of Shift Note    Bedside shift change report given to Kaiser Vigil (oncoming nurse) by Ania Molina (offgoing nurse). Report included the following information SBAR, Kardex, MAR, Accordion and Recent Results    Shift worked:  7p-7a     Shift summary and any significant changes:    Pt transferred to unit at 2230 from PCU. Assessed and oriented to room. Uneventful night. Pt slept. Concerns for physician to address:       Zone phone for oncoming shift:          Activity:  Activity Level: Up with Assistance  Number times ambulated in hallways past shift: 0  Number of times OOB to chair past shift: 0    Cardiac:   Cardiac Monitoring: No      Cardiac Rhythm: Sinus Rhythm    Access:   Current line(s): PIV     Genitourinary:   Urinary status: voiding    Respiratory:   O2 Device: Nasal cannula  Chronic home O2 use?: NO  Incentive spirometer at bedside: YES     GI:  Last Bowel Movement Date: 08/31/21  Current diet:  ADULT DIET Regular; Low Fat/Low Chol/High Fiber/KATIE  Passing flatus: YES  Tolerating current diet: YES       Pain Management:   Patient states pain is manageable on current regimen: YES    Skin:  Antonio Score: 20  Interventions: increase time out of bed, limit briefs, internal/external urinary devices and nutritional support     Patient Safety:  Fall Score:  Total Score: 3  Interventions: gripper socks and pt to call before getting OOB  High Fall Risk: Yes    Length of Stay:  Expected LOS: 4d 19h  Actual LOS: [address]

## 2021-09-08 NOTE — PROGRESS NOTES
Transition of Care Plan:     RUR: 11  Disposition: Home vs Home with Home Health (pending PT/OT progress)  Follow up appointments: PCP  DME needed: None  Transportation at [facility] will transport   Keys or means to access home:  Spouse has access to key       IM Medicare Letter: NA  Is patient a BCPI-A Bundle:      NA               If yes, was Bundle Letter given?:     Caregiver Contact: Marcelino Rasheed [phone]  Discharge Caregiver contacted prior to discharge? To be contacted prior to dc     Initial Note: CM phoned pt's room due to COVID precautions regarding his payor source. Pt stated that he is currently on strike from work, but indicated that he believes he has health insurance coverage through next month through [place]. Pt to confirm and update Care Management. If dispo is home health this information will be needed in order to make referrals. CM will continue to follow for dcp.      Minus RICK Reynolds  Care Manager, [address]

## 2021-09-08 NOTE — PROGRESS NOTES
Pulmonary, Critical Care, and Sleep Medicine    Name: Marino Montero II MRN: [id number]   : 1965 Hospital: [facility]   Date: 2021        IMPRESSION:   · Acute hypoxic respiratory failure  · COVID-19 pneumonia, unvaccinated  · COVID associated gastroenteritis      RECOMMENDATIONS:   · O2 - currently on 6 LPM  · Dexamethasone  · Mobilize  · Incentive spirometry  · proning  · Outside of window for remdesivir per hospital system criteria  · CRP lower, does not qualify for tocilizumab at this time  · COVID vaccine counseling done; wife at risk since she is not vaccinated pt did not get antiviral agent  · DVT prophylaxis     Subjective:       21: Now on 6 LPM. Mild  cough with occasional yellow sputum. Using incentive spirometer. Wife NOT vaccinated. Pt still very hesitant about getting his COVID vaccinations. 21: events of weekend reviewed; No diarrhea. Cough with occasional yellow sputum? O2 requirements went as high as 13 LPM, but currently weaned to 8 LPM. Using incentive spirometer. Sleeping on sides? .     9-3-21: O2 requirements went as high as 13 LPM, but currently weaned to 8 LPM.  Resting comfortably. 21: required more O2 overnight, now up to 11 LPM.  Dyspnea about the same. Initial history: This patient has been seen and evaluated at the request of Dr. Debby Ly for [facility]. Patient is a 48 y.o. unvaccinated male admitted overnight with COVID pneumonia. He has had 10 days of coughing and progressive dyspnea. Some diarrhea as well. Found to be hypoxic on admission. Since admission he has been weaned from 13 LPM down to 6 LPM.       Past Medical History:   Diagnosis Date    No pertinent past medical history       No past surgical history on file.    Prior to Admission medications    Not on File     No Known Allergies   Social History     Tobacco Use    Smoking status: Never Smoker    Smokeless tobacco: Never Used   Substance Use Topics    Alcohol use: Never      No family history on file. Current Facility-Administered Medications   Medication Dose Route Frequency    albuterol (PROVENTIL HFA, VENTOLIN HFA, PROAIR HFA) inhaler 2 Puff  2 Puff Inhalation QID RT    dexamethasone (DECADRON) 4 mg/mL injection 6 mg  6 mg IntraVENous Q24H    sodium chloride (NS) flush 5-40 mL  5-40 mL IntraVENous Q8H    polyethylene glycol (MIRALAX) packet 17 g  17 g Oral DAILY    enoxaparin (LOVENOX) injection 40 mg  40 mg SubCUTAneous Q24H       Review of Systems:  A comprehensive review of systems was negative except for that written in the HPI. Objective:   Vital Signs:    Visit Vitals  /72 (BP 1 Location: Right upper arm, BP Patient Position: Lying)   Pulse 87   Temp 98.1 °F (36.7 °C)   Resp 20   Ht 5' 8\" (1.727 m)   Wt 83.7 kg (184 lb 9.6 oz)   SpO2 95%   BMI 28.07 kg/m²       O2 Device: Nasal cannula   O2 Flow Rate (L/min): 5 l/min   Temp (24hrs), Av.7 °F (37.1 °C), Min:98.1 °F (36.7 °C), Max:100 °F (37.8 °C)       Intake/Output:   Last shift:      701 - 1900  In: -   Out: 250 [Urine:250]  Last 3 shifts: 1901 -  07  In: 0   Out: 300 [Urine:300]    Intake/Output Summary (Last 24 hours) at 2021 1453  Last data filed at 2021 1157  Gross per 24 hour   Intake 0 ml   Output 550 ml   Net -550 ml      Physical Exam:   General:  Alert, ill appearing, no distress   Head:  Normocephalic, without obvious abnormality, atraumatic. Eyes:  Conjunctivae/corneas clear. Nose: Nares normal. Septum midline. Mucosa normal.    Throat: Lips, mucosa, and tongue normal.     Neck: Supple, symmetrical, trachea midline    Lungs:   Distant bilateral breath sounds   Chest wall:  No tenderness or deformity. Heart:  Regular rate and rhythm    Abdomen:   Soft, non-tender.  Bowel sounds normal.    Extremities: Extremities normal, atraumatic, no cyanosis    Skin: Skin color, texture, turgor normal. No rashes or lesions   Neurologic: Grossly nonfocal Data:   Labs:  Recent Labs     09/07/21  0405 09/06/21  0201   WBC 10.0 8.5   HGB 15.3 15.2   HCT 46.9 46.0    424*     Recent Labs     09/07/21  0405 09/06/21  0201    136   K 4.6 4.7    102   CO2 34* 29   * 121*   BUN 23* 20   CREA 1.06 0.82   CA 8.8 8.8   ALB 2.7* 2.6*   TBILI 0.8 0.9   * 145*     No results for input(s): PHI, PCO2I, PO2I, HCO3I, FIO2I in the last 72 hours.     Imaging:  I have personally reviewed the patients radiographs:  None today        Jesse Raygoza MD

## 2021-09-08 NOTE — PROGRESS NOTES
Problem: Falls - Risk of  Goal: *Absence of Falls  Description: Document [place] Sites Fall Risk and appropriate interventions in the flowsheet. Outcome: Progressing Towards Goal  Note: Fall Risk Interventions:  Mobility Interventions: Bed/chair exit alarm         Medication Interventions: Bed/chair exit alarm, Patient to call before getting OOB, Teach patient to arise slowly    Elimination Interventions: Bed/chair exit alarm, Call light in reach, Patient to call for help with toileting needs              Problem: Risk for Spread of Infection  Goal: Prevent transmission of infectious organism to others  Description: Prevent the transmission of infectious organisms to other patients, staff members, and visitors.   Outcome: Progressing Towards Goal     Problem: Airway Clearance - Ineffective  Goal: Achieve or maintain patent airway  Outcome: Progressing Towards Goal     Problem: Gas Exchange - Impaired  Goal: Absence of hypoxia  Outcome: Progressing Towards Goal     Problem: Loneliness or Risk for Loneliness  Goal: Demonstrate positive use of time alone when socialization is not possible  Outcome: Progressing Towards Goal

## 2021-09-09 LAB
ANION GAP SERPL CALC-SCNC: 4 MMOL/L (ref 5–15)
BUN SERPL-MCNC: 26 MG/DL (ref 6–20)
BUN/CREAT SERPL: 28 (ref 12–20)
CALCIUM SERPL-MCNC: 8.8 MG/DL (ref 8.5–10.1)
CHLORIDE SERPL-SCNC: 102 MMOL/L (ref 97–108)
CO2 SERPL-SCNC: 30 MMOL/L (ref 21–32)
CREAT SERPL-MCNC: 0.92 MG/DL (ref 0.7–1.3)
GLUCOSE SERPL-MCNC: 113 MG/DL (ref 65–100)
POTASSIUM SERPL-SCNC: 4.6 MMOL/L (ref 3.5–5.1)
SODIUM SERPL-SCNC: 136 MMOL/L (ref 136–145)

## 2021-09-09 PROCEDURE — 74011250637 HC RX REV CODE- 250/637: Performed by: STUDENT IN AN ORGANIZED HEALTH CARE EDUCATION/TRAINING PROGRAM

## 2021-09-09 PROCEDURE — 36415 COLL VENOUS BLD VENIPUNCTURE: CPT

## 2021-09-09 PROCEDURE — 74011250636 HC RX REV CODE- 250/636: Performed by: INTERNAL MEDICINE

## 2021-09-09 PROCEDURE — 97535 SELF CARE MNGMENT TRAINING: CPT

## 2021-09-09 PROCEDURE — 74011250637 HC RX REV CODE- 250/637: Performed by: INTERNAL MEDICINE

## 2021-09-09 PROCEDURE — 97530 THERAPEUTIC ACTIVITIES: CPT

## 2021-09-09 PROCEDURE — 97116 GAIT TRAINING THERAPY: CPT

## 2021-09-09 PROCEDURE — 65270000029 HC RM PRIVATE

## 2021-09-09 PROCEDURE — 94640 AIRWAY INHALATION TREATMENT: CPT

## 2021-09-09 PROCEDURE — 80048 BASIC METABOLIC PNL TOTAL CA: CPT

## 2021-09-09 PROCEDURE — 77010033678 HC OXYGEN DAILY

## 2021-09-09 PROCEDURE — 94760 N-INVAS EAR/PLS OXIMETRY 1: CPT

## 2021-09-09 RX ADMIN — ALBUTEROL SULFATE 2 PUFF: 90 AEROSOL, METERED RESPIRATORY (INHALATION) at 20:28

## 2021-09-09 RX ADMIN — ALBUTEROL SULFATE 2 PUFF: 90 AEROSOL, METERED RESPIRATORY (INHALATION) at 08:42

## 2021-09-09 RX ADMIN — Medication 10 ML: at 15:31

## 2021-09-09 RX ADMIN — POLYETHYLENE GLYCOL 3350 17 G: 17 POWDER, FOR SOLUTION ORAL at 09:33

## 2021-09-09 RX ADMIN — Medication 10 ML: at 05:21

## 2021-09-09 RX ADMIN — DEXAMETHASONE SODIUM PHOSPHATE 6 MG: 4 INJECTION, SOLUTION INTRAMUSCULAR; INTRAVENOUS at 15:31

## 2021-09-09 RX ADMIN — ALBUTEROL SULFATE 2 PUFF: 90 AEROSOL, METERED RESPIRATORY (INHALATION) at 12:20

## 2021-09-09 RX ADMIN — Medication 10 ML: at 20:28

## 2021-09-09 RX ADMIN — ENOXAPARIN SODIUM 40 MG: 40 INJECTION SUBCUTANEOUS at 09:33

## 2021-09-09 RX ADMIN — ALBUTEROL SULFATE 2 PUFF: 90 AEROSOL, METERED RESPIRATORY (INHALATION) at 15:03

## 2021-09-09 NOTE — PROGRESS NOTES
Hospitalist Progress Note    NAME: Mary Horvath II   :  1965   MRN:  [id number]       Assessment / Plan:  Acute respiratory failure with hypoxia POA due to  COVID-19 pneumonia POA causing  Sepsis POA     Patient doing much better, wean off the oxygen supplementation. Walk test done with the physical therapy. Still tachycardic doing ADLs. C/w IV dexamethasone  Didn't qualify for Remdesivir per hospital protocol as her symptoms began 10 days back  F/u  inflammatory markers  C/w Emperic Abx  CRP low so doesn't qualify for Tocilizumab  Pulmonary following  Xanax started for anxiety  Can be transferred to tele      Obesity POA Body mass index is 30.41 kg/m².     DVT prophylaxis with lovenox     Code status: full code  NOK: wife  Updated wife Lesa Ribeiro over the phone , requesting for regular update     Estimated discharge date: Tomorrow  Barriers: Clinical improvement     Subjective:     Chief Complaint / Reason for Physician Visit  Patient seen today, doing fine, no complaints today. No acute events overnight    Objective:     VITALS:   Last 24hrs VS reviewed since prior progress note. Most recent are:  Patient Vitals for the past 24 hrs:   Temp Pulse Resp BP SpO2   21 0842     90 %   21 0833 98.1 °F (36.7 °C) 65 18 117/69 91 %   21 0506 97.8 °F (36.6 °C) 65 18 (!) 149/83 95 %   21 2134 99.2 °F (37.3 °C) 80 19 119/73 95 %   21 1527 97.8 °F (36.6 °C) 83 19 112/77 94 %   21 1154 98.1 °F (36.7 °C) 87 20 116/72 95 %       Intake/Output Summary (Last 24 hours) at 2021 1139  Last data filed at 2021 2134  Gross per 24 hour   Intake 0 ml   Output 550 ml   Net -550 ml        I had a face to face encounter and independently examined this patient on 2021, as outlined below:  PHYSICAL EXAM:  General: WD, WN. Alert, cooperative, no acute distress    EENT:  EOMI. Anicteric sclerae. MMM  Resp:  CTA bilaterally, no wheezing or rales.   No accessory muscle use  CV:  Regular  rhythm,  No edema  GI:  Soft, Non distended, Non tender. +Bowel sounds  Neurologic:  Alert and oriented X 3, normal speech,   Psych:   Good insight. Not anxious nor agitated  Skin:  No rashes. No jaundice    Reviewed most current lab test results and cultures  YES  Reviewed most current radiology test results   YES  Review and summation of old records today    NO  Reviewed patient's current orders and MAR    YES  PMH/SH reviewed - no change compared to H&P  ________________________________________________________________________  Care Plan discussed with:    Comments   Patient y    Family      RN y    Care Manager     Consultant                       y Multidiciplinary team rounds were held today with , nursing, pharmacist and clinical coordinator. Patient's plan of care was discussed; medications were reviewed and discharge planning was addressed. ________________________________________________________________________  Total NON critical care TIME: 35   Minutes    Total CRITICAL CARE TIME Spent:   Minutes non procedure based      Comments   >50% of visit spent in counseling and coordination of care     ________________________________________________________________________  Blessing Knutson MD     Procedures: see electronic medical records for all procedures/Xrays and details which were not copied into this note but were reviewed prior to creation of Plan. LABS:  I reviewed today's most current labs and imaging studies.   Pertinent labs include:  Recent Labs     09/07/21  0405   WBC 10.0   HGB 15.3   HCT 46.9        Recent Labs     09/09/21  0500 09/07/21  0405    136   K 4.6 4.6    101   CO2 30 34*   * 104*   BUN 26* 23*   CREA 0.92 1.06   CA 8.8 8.8   ALB  --  2.7*   TBILI  --  0.8   ALT  --  126*       Signed: Blessing Knutson MD

## 2021-09-09 NOTE — PROGRESS NOTES
Comprehensive Nutrition Assessment    Type and Reason for Visit: Initial, RD nutrition re-screen/LOS    Nutrition Recommendations/Plan:   Continue current diet    Nutrition Assessment:      Chart reviewed for LOS. Pt noted for acute respiratory failure d/t COVID-19 PNA, sepsis. No significant PMHx. RD unable to visit bedside d/t covid isolation. Per chart, pt has consistently been eating well. MST negative for risk factors. No acute nutrition concerns at this time. Patient Vitals for the past 168 hrs:   % Diet Eaten   09/06/21 1800 76 - 100%   09/06/21 1215 76 - 100%   09/06/21 0800 76 - 100%   09/05/21 1300 76 - 100%   09/05/21 0900 76 - 100%   09/04/21 1505 76 - 100%   09/04/21 1019 76 - 100%   09/03/21 1838 26 - 50%   09/03/21 1528 76 - 100%   09/03/21 0948 51 - 75%   09/02/21 1930 76 - 100%       Estimated Daily Nutrient Needs:  Energy (kcal): 2147 kcals (BMR x 1. 3AF); Weight Used for Energy Requirements: Current  Protein (g): 67g (0.8g/kg); Weight Used for Protein Requirements: Current  Fluid (ml/day): 2100mL; Method Used for Fluid Requirements: 1 ml/kcal      Nutrition Related Findings:  Labs: reviewed. Meds: decadron, miralax. BM 8/31. Wounds:    None       Current Nutrition Therapies:  ADULT DIET Regular; Low Fat/Low Chol/High Fiber/KATIE    Anthropometric Measures:  · Height:  5' 8\" (172.7 cm)  · Current Body Wt:  83.7 kg (184 lb 8.4 oz)   · Ideal Body Wt:  154 lbs:  119.8 %   · BMI Category: Overweight (BMI 25.0-29. 9)       Nutrition Diagnosis:   No nutrition diagnosis at this time    Nutrition Interventions:   Food and/or Nutrient Delivery: Continue current diet  Nutrition Education and Counseling: No recommendations at this time  Coordination of Nutrition Care: No recommendation at this time    Goals:  Continue PO intake >70% meals next 5-7 days       Nutrition Monitoring and Evaluation:   Behavioral-Environmental Outcomes: None identified  Food/Nutrient Intake Outcomes: Food and nutrient intake  Physical Signs/Symptoms Outcomes: Biochemical data, Weight, GI status    Discharge Planning:    Continue current diet     Electronically signed by Karthikeyan Yeboah RD on 9/9/2021 at 4:21 PM    Contact: [phone]

## 2021-09-09 NOTE — PROGRESS NOTES
End of Shift Note    Bedside shift change report given to Franchesca Burton RN (oncoming nurse) by Ten Razo RN (offgoing nurse). Report included the following information SBAR, Kardex and MAR    Shift worked:  7AM-7PM     Shift summary and any significant changes:     Patient currently on room air. O2 sats in the 90's. No respiratory distress noted/     Concerns for physician to address:  none     Zone phone for oncoming shift:   [phone]       Activity:  Activity Level: Up with Assistance  Number times ambulated in hallways past shift: 0  Number of times OOB to chair past shift: 1    Cardiac:   Cardiac Monitoring: No      Cardiac Rhythm: Sinus Rhythm    Access:   Current line(s): PIV     Genitourinary:   Urinary status: voiding    Respiratory:   O2 Device: None (Room air)  Chronic home O2 use?: N/A  Incentive spirometer at bedside: YES  Actual Volume (ml): 750 ml  GI:  Last Bowel Movement Date: 08/31/21  Current diet:  ADULT DIET Regular; Low Fat/Low Chol/High Fiber/KATIE  Passing flatus: YES  Tolerating current diet: YES       Pain Management:   Patient states pain is manageable on current regimen: N/A    Skin:  Antonio Score: 20  Interventions: increase time out of bed    Patient Safety:  Fall Score:  Total Score: 3  Interventions: gripper socks  High Fall Risk: Yes    Length of Stay:  Expected LOS: 4d 19h  Actual LOS: [address] RN

## 2021-09-09 NOTE — PROGRESS NOTES
Problem: Mobility Impaired (Adult and Pediatric)  Goal: *Acute Goals and Plan of Care (Insert Text)  Description: FUNCTIONAL STATUS PRIOR TO ADMISSION: Pt lives with wife in one story home. He is fully independent at baseline without device and works in [employer] as . HOME SUPPORT PRIOR TO ADMISSION: The patient lived with wife but did not require assist.    Physical Therapy Goals  Initiated 9/1/2021 Updated 9/9/21 - goal #1 met; others remain appropriate  1. Patient will move from supine to sit and sit to supine , scoot up and down, and roll side to side in bed with independence within 7 day(s). MET  2. Patient will transfer from bed to chair and chair to bed with independence using the least restrictive device within 7 day(s). 3.  Patient will perform sit to stand with independence within 7 day(s). 4.  Patient will ambulate with independence for 150 feet with the least restrictive device within 7 day(s). 5.  Patient will ascend/descend 4 stairs with handrail(s) with independence within 7 day(s). (unless unable due to precautions)      Outcome: Progressing Towards Goal   PHYSICAL THERAPY TREATMENT: WEEKLY REASSESSMENT  Patient: Sathya Perkins II (54 y.o. male)  Date: 9/9/2021  Primary Diagnosis: Pneumonia due to COVID-19 virus [U07.1, J12.82]        Precautions:Fall      ASSESSMENT  Patient continues with skilled PT services and is progressing towards goals. Pt showing good progress in all areas. Pt on 5L at rest upon entering room. Hospitalist in to see pt. Supervised weaning pt down to room air with amb/activity. Pt did well with all mobility, now independent with bed mobility and supervision with all out of bed activity to provide cues for pacing and assess tolerance. Pt shows good balance and was able to toilet self in BR. Pt slightly SOB with extended activity washing/doing teeth in bathroom and dropped to 88% on room air as noted below.  He was able to return to 90s with cues for breathing and within 30 sec to 1min. Pt tachycardic with activity to as much as 120 BPM. Pt left on room air, in chair with resting sats in min to high 90s. Completed education on pacing and energy conservation. Pt still seems to have some limited insight into how to progress his activity upon return home. He would benefit from HHPT upon d/c to allow continued monitoring and education and overall strengthening as pt increases his level of activity in preparation for return to work. Pulse oximetry assessment   98% at rest on room air (if 88% or less, skip next steps)  94% while ambulating on room air  92%  Initially post completing ADLs on room air, dropped to 88% then seated in chair; ~30 sec to one minute to recover to 91% with PLB  HR range 102 and 120 bpm    Patient's progression toward goals since last assessment: Pt showing progress in all areas moving from 6L to room air and from significant desaturation to 82% with activity to the above. Current Level of Function Impacting Discharge (mobility/balance): supervision to independent, cues for pacing and energy conservation    Functional Outcome Measure: The patient scored 80/100 on the barthel outcome measure which is indicative of 20% functional impairment. Other factors to consider for discharge: limited activity tolerance, just weaned to room air this session         PLAN :  Goals have been updated based on progression since last assessment. Patient continues to benefit from skilled intervention to address the above impairments. Recommendations and Planned Interventions: bed mobility training, transfer training, gait training, therapeutic exercises, patient and family training/education, and therapeutic activities      Frequency/Duration: Patient will be followed by physical therapy:  3 times a week to address goals.     Recommendation for discharge: (in order for the patient to meet his/her long term goals)  Physical therapy at least 2 days/week in the home     This discharge recommendation:  Has been made in collaboration with the attending provider and/or case management    IF patient discharges home will need the following DME: none         SUBJECTIVE:   Patient stated So can I go back to work, we are on strike but I could sit there for 30 minutes or so? Aguila Hurley    OBJECTIVE DATA SUMMARY:   HISTORY:    Past Medical History:   Diagnosis Date    No pertinent past medical history    No past surgical history on file. Home Situation  Home Environment: Private residence  # Steps to Enter: 4  Rails to Enter: Yes  One/Two Story Residence: One story  Living Alone: No  Support Systems: Spouse/Significant Other/Partner  Patient Expects to be Discharged to[facility]> [place]  Current DME Used/Available at Home: None  Tub or Shower Type: Tub/Shower combination    EXAMINATION/PRESENTATION/DECISION MAKING:   Critical Behavior:  Neurologic State: Alert  Orientation Level: Oriented X4  Cognition: Appropriate for age attention/concentration, Follows commands  Safety/Judgement: Awareness of environment, Good awareness of safety precautions  Hearing:   Auditory  Auditory Impairment: None    Range Of Motion:  AROM: Within functional limits                       Strength:    Strength: Generally decreased, functional              Functional Mobility:  Bed Mobility:  Rolling: Independent  Supine to Sit: Independent  Sit to Supine: Independent  Scooting: Independent  Transfers:  Sit to Stand: Supervision  Stand to Sit: Supervision        Bed to Chair: Supervision              Balance:   Sitting: Intact  Standing: Impaired  Standing - Static: Good  Standing - Dynamic : Good  Ambulation/Gait Training:  Distance (ft): 120 Feet (ft) (with 4 seated rests)  Assistive Device:  (none)  Ambulation - Level of Assistance: Supervision        Gait Abnormalities: Decreased step clearance              Speed/Mercy: Slow  Step Length: Left shortened;Right shortened            Functional Measure:  Barthel Index:    Bathin  Bladder: 10  Bowels: 10  Groomin  Dressing: 10  Feeding: 10  Mobility: 10  Stairs: 0  Toilet Use: 10  Transfer (Bed to Chair and Back): 15  Total: 80/100       The Barthel ADL Index: Guidelines  1. The index should be used as a record of what a patient does, not as a record of what a patient could do. 2. The main aim is to establish degree of independence from any help, physical or verbal, however minor and for whatever reason. 3. The need for supervision renders the patient not independent. 4. A patient's performance should be established using the best available evidence. Asking the patient, friends/relatives and nurses are the usual sources, but direct observation and common sense are also important. However direct testing is not needed. 5. Usually the patient's performance over the preceding 24-48 hours is important, but occasionally longer periods will be relevant. 6. Middle categories imply that the patient supplies over 50 per cent of the effort. 7. Use of aids to be independent is allowed. Sofia Rico., Barthel, D.W. (0327). Functional evaluation: the Barthel Index. [facility] (14)2. Rip Chow crow JEREL Romo, Althea Rucker., Benigno Burks., [place], [address] [place] [address] Ave (). Measuring the change indisability after inpatient rehabilitation; comparison of the responsiveness of the Barthel Index and Functional [place] Measure. Journal of Neurology, Neurosurgery, and Psychiatry, 66(4), 765-143. Marline Wang, N.J.A, KELSIE Alvarez, & Micah Torres M.A. (2004.) Assessment of post-stroke quality of life in cost-effectiveness studies: The usefulness of the Barthel Index and the EuroQoL-5D.  Quality of Life Research, 15, 157-05          Activity Tolerance:   Fair    After treatment patient left in no apparent distress:   Sitting in chair and Call bell within reach    COMMUNICATION/EDUCATION:   The patients plan of care was discussed with: Occupational therapist, Registered nurse, and Physician. Fall prevention education was provided and the patient/caregiver indicated understanding., Patient/family have participated as able in goal setting and plan of care. , and Patient/family agree to work toward stated goals and plan of care.     Thank you for this referral.  Barbara Shin, PT   Time Calculation: 41 mins

## 2021-09-09 NOTE — PROGRESS NOTES
Problem: Self Care Deficits Care Plan (Adult)  Goal: *Acute Goals and Plan of Care (Insert Text)  Description:   FUNCTIONAL STATUS PRIOR TO ADMISSION: Patient was independent and active without use of DME. Patient was independent for basic and instrumental ADLs. HOME SUPPORT: The patient lived with family. Occupational Therapy Goals  Initiated 9/7/2021  1. Patient will perform grooming with modified independence within 7 day(s). 2.  Patient will perform upper body dressing with modified independence within 7 day(s). 3.  Patient will perform lower body dressing with modified independence within 7 day(s). 4.  Patient will perform toilet transfers with modified independence within 7 day(s). 5.  Patient will perform all aspects of toileting with modified independence within 7 day(s). 6.  Patient will participate in upper extremity therapeutic exercise/activities with modified independence for 5 minutes within 7 day(s). 7.  Patient will utilize energy conservation techniques, pacing strategies, and pursed lipped breathing during functional activities with verbal cues within 7 day(s). Outcome: Progressing Towards Goal   OCCUPATIONAL THERAPY TREATMENT  Patient: Kristie Bermeo II (48 y.o. male)  Date: 9/9/2021  Diagnosis: Pneumonia due to COVID-19 virus [U07.1, J12.82] <principal problem not specified>       Precautions: Fall  Chart, occupational therapy assessment, plan of care, and goals were reviewed. ASSESSMENT  Patient continues with skilled OT services and is progressing towards goals. Patient received semisupine in bed on 5L O2 and agreeable for therapy. Patient completed supine > sit with independence and demonstrated intact sitting balance. While seated, hospitalist in to see patient and requested O2 to be titrated to 2L O2. Patient ambulated in room with PT present with O2 sats remaining in 90s.  Patient placed on room air per hospitalist request. Patient participated in ADLs and functional mobility in the room and bathroom with mod I to supervision. Patient's O2 sats dropped to 88% post bathroom ADLs but recovered within 45 sec using pursed lipped breathing to 91%. Patient educated at length on energy conservation techniques, pacing strategies, and pursed lipped breathing to utilize for home safety. Patient was left sitting in chair with all needs met and on room air. Patient continues to demonstrated general weakness, impaired balance, and decreased activity tolerance. Patient would continue to benefit from skilled OT services during acute hospital stay. Recommend patient return home with HHOT/PT. Current Level of Function Impacting Discharge (ADLs): mod I to supervision for ADLs, independent to supervision for functional mobility     Other factors to consider for discharge: covid +         PLAN :  Patient continues to benefit from skilled intervention to address the above impairments. Continue treatment per established plan of care to address goals. Recommendation for discharge: (in order for the patient to meet his/her long term goals)  Occupational therapy at least 2 days/week in the home     This discharge recommendation:  Has been made in collaboration with the attending provider and/or case management    IF patient discharges home will need the following DME: none       SUBJECTIVE:   Patient stated I feel pretty good.     OBJECTIVE DATA SUMMARY:   Cognitive/Behavioral Status:  Neurologic State: Alert  Orientation Level: Oriented X4  Cognition: Appropriate for age attention/concentration; Follows commands  Perception: Appears intact  Perseveration: No perseveration noted  Safety/Judgement: Awareness of environment;Good awareness of safety precautions    Functional Mobility and Transfers for ADLs:  Bed Mobility:  Rolling: Independent  Supine to Sit: Independent  Sit to Supine: Independent  Scooting: Independent    Transfers:  Sit to Stand: Supervision  Stand to Sit: Supervision Functional Transfers  Bathroom Mobility: Supervision/set up  Cues: Verbal cues provided  Bed to Chair: Supervision    Balance:  Sitting: Intact  Standing: Impaired  Standing - Static: Good  Standing - Dynamic : Good    ADL Intervention:    Grooming  Position Performed: Standing (at sink)  Washing Hands: Supervision  Cues: Verbal cues provided    Lower Body Dressing Assistance  Socks: Supervision  Leg Crossed Method Used: No  Position Performed: Bending forward method;Seated edge of bed  Cues: Don;Verbal cues provided    Toileting  Bladder Hygiene: Supervision  Clothing Management: Modified independent  Cues: Verbal cues provided    Cognitive Retraining  Safety/Judgement: Awareness of environment;Good awareness of safety precautions    Pain:  Patient did not c/o pain during session. Activity Tolerance:   Good, SpO2 stable on RA, requires rest breaks, and observed SOB with activity    After treatment patient left in no apparent distress:   Sitting in chair and Call bell within reach    COMMUNICATION/COLLABORATION:   The patients plan of care was discussed with: Physical therapist and Registered nurse.      Williams Styles OTR/L  Time Calculation: 40 mins

## 2021-09-09 NOTE — PROGRESS NOTES
Pulmonary, Critical Care, and Sleep Medicine    Name: Kendell Irwin II MRN: [id number]   : 1965 Hospital: [facility]   Date: 2021        IMPRESSION:   · Acute hypoxic respiratory failure  · COVID-19 pneumonia, unvaccinated; did not qualify for tocilizumab or remdesivir  · COVID associated gastroenteritis      RECOMMENDATIONS:   · D/c per team  · Finish Dexamethasone  · Mobilize  · Incentive spirometry  · proning  · COVID vaccine counseling done; wife at risk since she is not vaccinated pt did not get antiviral agent  · Will sign off, nothing to add     Subjective:   21: sitting up in chair on room air. Not SOb. Mild  cough with occasional yellow sputum. Using incentive spirometer. Pt still very hesitant about getting his COVID vaccinations. 21: Now on 6 LPM. Mild  cough with occasional yellow sputum. Using incentive spirometer. Wife NOT vaccinated. Pt still very hesitant about getting his COVID vaccinations. 21: events of weekend reviewed; No diarrhea. Cough with occasional yellow sputum? O2 requirements went as high as 13 LPM, but currently weaned to 8 LPM. Using incentive spirometer. Sleeping on sides? .     9-3-21: O2 requirements went as high as 13 LPM, but currently weaned to 8 LPM.  Resting comfortably. 21: required more O2 overnight, now up to 11 LPM.  Dyspnea about the same. Initial history: This patient has been seen and evaluated at the request of Dr. Crista Khan for [facility]. Patient is a 48 y.o. unvaccinated male admitted overnight with COVID pneumonia. He has had 10 days of coughing and progressive dyspnea. Some diarrhea as well. Found to be hypoxic on admission. Since admission he has been weaned from 13 LPM down to 6 LPM.       Past Medical History:   Diagnosis Date    No pertinent past medical history       No past surgical history on file.    Prior to Admission medications    Not on File     No Known Allergies   Social History     Tobacco Use    Smoking status: Never Smoker    Smokeless tobacco: Never Used   Substance Use Topics    Alcohol use: Never      No family history on file. Current Facility-Administered Medications   Medication Dose Route Frequency    albuterol (PROVENTIL HFA, VENTOLIN HFA, PROAIR HFA) inhaler 2 Puff  2 Puff Inhalation QID RT    dexamethasone (DECADRON) 4 mg/mL injection 6 mg  6 mg IntraVENous Q24H    sodium chloride (NS) flush 5-40 mL  5-40 mL IntraVENous Q8H    polyethylene glycol (MIRALAX) packet 17 g  17 g Oral DAILY    enoxaparin (LOVENOX) injection 40 mg  40 mg SubCUTAneous Q24H       Review of Systems:  A comprehensive review of systems was negative except for that written in the HPI. Objective:   Vital Signs:    Visit Vitals  /78 (BP 1 Location: Left upper arm, BP Patient Position: Sitting)   Pulse 94   Temp 98.5 °F (36.9 °C)   Resp 18   Ht 5' 8\" (1.727 m)   Wt 83.7 kg (184 lb 9.6 oz)   SpO2 95%   BMI 28.07 kg/m²       O2 Device: None (Room air)   O2 Flow Rate (L/min): 5 l/min   Temp (24hrs), Av.4 °F (36.9 °C), Min:97.8 °F (36.6 °C), Max:99.2 °F (37.3 °C)       Intake/Output:   Last shift:      No intake/output data recorded. Last 3 shifts:  1901 -  0700  In: 0   Out: 850 [Urine:850]    Intake/Output Summary (Last 24 hours) at 2021 1536  Last data filed at 2021 2134  Gross per 24 hour   Intake 0 ml   Output 200 ml   Net -200 ml      Physical Exam:   General:  Alert, ill appearing, no distress   Head:  Normocephalic, without obvious abnormality, atraumatic. Eyes:  Conjunctivae/corneas clear. Nose: Nares normal. Septum midline. Mucosa normal.    Throat: Lips, mucosa, and tongue normal.     Neck: Supple, symmetrical, trachea midline    Lungs:   Distant bilateral breath sounds   Chest wall:  No tenderness or deformity. Heart:  Regular rate and rhythm    Abdomen:   Soft, non-tender.  Bowel sounds normal.    Extremities: Extremities normal, atraumatic, no cyanosis    Skin: Skin color, texture, turgor normal. No rashes or lesions   Neurologic: Grossly nonfocal     Data:   Labs:  Recent Labs     09/07/21  0405   WBC 10.0   HGB 15.3   HCT 46.9        Recent Labs     09/09/21  0500 09/07/21  0405    136   K 4.6 4.6    101   CO2 30 34*   * 104*   BUN 26* 23*   CREA 0.92 1.06   CA 8.8 8.8   ALB  --  2.7*   TBILI  --  0.8   ALT  --  126*     No results for input(s): PHI, PCO2I, PO2I, HCO3I, FIO2I in the last 72 hours.     Imaging:  I have personally reviewed the patients radiographs:  None today        Paradise Bingham MD

## 2021-09-09 NOTE — PROGRESS NOTES
Physical Therapy    Pt seen for session; pt on 5L at rest upon entering room. Hospitalist in to see pt. Supervised weaning pt down to room air with amb/activity:    Pulse oximetry assessment   98% at rest on room air (if 88% or less, skip next steps)  94% while ambulating on room air  92%  Initially post completing ADLs on room air, dropped to 88% then seated in chair; ~30 sec to one minute to recover to 91% with PLB  HR range 102 and 120 bpm    Full note for session to follow.     Patricia Edwards, PT

## 2021-09-10 VITALS
OXYGEN SATURATION: 97 % | WEIGHT: 184.6 LBS | SYSTOLIC BLOOD PRESSURE: 116 MMHG | HEIGHT: 68 IN | RESPIRATION RATE: 18 BRPM | DIASTOLIC BLOOD PRESSURE: 82 MMHG | TEMPERATURE: 98.2 F | BODY MASS INDEX: 27.98 KG/M2 | HEART RATE: 76 BPM

## 2021-09-10 LAB
ANION GAP SERPL CALC-SCNC: 5 MMOL/L (ref 5–15)
BUN SERPL-MCNC: 22 MG/DL (ref 6–20)
BUN/CREAT SERPL: 26 (ref 12–20)
CALCIUM SERPL-MCNC: 8.5 MG/DL (ref 8.5–10.1)
CHLORIDE SERPL-SCNC: 102 MMOL/L (ref 97–108)
CO2 SERPL-SCNC: 30 MMOL/L (ref 21–32)
CREAT SERPL-MCNC: 0.86 MG/DL (ref 0.7–1.3)
ERYTHROCYTE [DISTWIDTH] IN BLOOD BY AUTOMATED COUNT: 12.4 % (ref 11.5–14.5)
GLUCOSE SERPL-MCNC: 103 MG/DL (ref 65–100)
HCT VFR BLD AUTO: 48.6 % (ref 36.6–50.3)
HGB BLD-MCNC: 15.4 G/DL (ref 12.1–17)
MCH RBC QN AUTO: 28.5 PG (ref 26–34)
MCHC RBC AUTO-ENTMCNC: 31.7 G/DL (ref 30–36.5)
MCV RBC AUTO: 89.8 FL (ref 80–99)
NRBC # BLD: 0 K/UL (ref 0–0.01)
NRBC BLD-RTO: 0 PER 100 WBC
PLATELET # BLD AUTO: 308 K/UL (ref 150–400)
PMV BLD AUTO: 9 FL (ref 8.9–12.9)
POTASSIUM SERPL-SCNC: 4.8 MMOL/L (ref 3.5–5.1)
RBC # BLD AUTO: 5.41 M/UL (ref 4.1–5.7)
SODIUM SERPL-SCNC: 137 MMOL/L (ref 136–145)
WBC # BLD AUTO: 10.6 K/UL (ref 4.1–11.1)

## 2021-09-10 PROCEDURE — 36415 COLL VENOUS BLD VENIPUNCTURE: CPT

## 2021-09-10 PROCEDURE — 80048 BASIC METABOLIC PNL TOTAL CA: CPT

## 2021-09-10 PROCEDURE — 94760 N-INVAS EAR/PLS OXIMETRY 1: CPT

## 2021-09-10 PROCEDURE — 85027 COMPLETE CBC AUTOMATED: CPT

## 2021-09-10 RX ORDER — ALBUTEROL SULFATE 90 UG/1
2 AEROSOL, METERED RESPIRATORY (INHALATION)
Qty: 1 EACH | Refills: 0 | Status: SHIPPED | OUTPATIENT
Start: 2021-09-10 | End: 2021-10-10

## 2021-09-10 RX ADMIN — Medication 10 ML: at 06:00

## 2021-09-10 NOTE — PROGRESS NOTES
[address] September 10, 2021       RE: Antonio Barroso II      To Whom It May Concern,    This is to certify that Antonio Barroso II may may return to work on 09/17/2021 . No restriction on activity after 09/17/2021    Please feel free to contact my office if you have any questions or concerns. Thank you for your assistance in this matter.       Sincerely,  Cynthia Corona MD

## 2021-09-10 NOTE — DISCHARGE SUMMARY
Hospitalist Discharge Summary     Patient ID:  Eli Rivers  [id number]  48 y.o.  1965 8/31/2021    PCP on record: None    Admit date: 8/31/2021  Discharge date and time: 9/10/2021    DISCHARGE DIAGNOSIS:    Acute respiratory failure with hypoxia POA  COVID-19 pneumonia POA  Sepsis POA     CONSULTATIONS:  IP CONSULT TO PULMONOLOGY    Excerpted HPI from H&P of Earl Verma MD:  67-year-old male     Denies significant past medical history  Does not wear home oxygen  Not received the COVID-19 vaccine     Past 10 days \"having nonstop coughing\"  Cough react reactive  Progressively worsening shortness of breath, worse with walking              Not to the point he is short of breath at rest              Even walking short distance makes him very winded  Generalized weakness and fatigue              Interferes with his ability to do ADLs and yard work  Positive intermittent diarrhea  Poor p.o. intake  Fevers, febrile to 101.6 at [address] [place] [address]  No nausea vomiting, sore throat, loss of taste or smell     Went to [address] [place] [address] ER 8/30/2021  Febrile to 101.6  Respiratory rate 40  Chest x-ray with patchy bibasilar airspace disease  Rapid Covid positive  Room air sats 82%, now on 15 L O2  Transferred to [facility] for further management    ______________________________________________________________________  DISCHARGE SUMMARY/HOSPITAL COURSE:  for full details see H&P, daily progress notes, labs, consult notes. Acute respiratory failure with hypoxia POA due to  COVID-19 pneumonia POA causing  Sepsis POA      Patient shortness of breath resolved. On room air able to move around without any difficulty.   C/w IV dexamethasone  Didn't qualify for Remdesivir per hospital protocol as her symptoms began 10 days back  F/u  inflammatory markers  C/w Emperic Abx  CRP low so doesn't qualify for Tocilizumab  Pulmonary following  Xanax started for anxiety  Discharge home today.      _______________________________________________________________________  Patient seen and examined by me on discharge day. Pertinent Findings:  Gen:    Not in distress  Chest: Clear lungs  CVS:   Regular rhythm. No edema  Abd:  Soft, not distended, not tender  Neuro:  Alert,   _______________________________________________________________________  DISCHARGE MEDICATIONS:   Current Discharge Medication List      START taking these medications    Details   albuterol (PROVENTIL HFA, VENTOLIN HFA, PROAIR HFA) 90 mcg/actuation inhaler Take 2 Puffs by inhalation every four (4) hours as needed for Wheezing or Shortness of Breath for up to 30 days. Qty: 1 Each, Refills: 0  Start date: 9/10/2021, End date: 10/10/2021               Patient Follow Up Instructions: Activity: Activity as tolerated  Diet: Cardiac Diet  Wound Care: None needed    If you have questions regarding the hospital related prescriptions or hospital related issues please call [phone] [facility] at . You can always direct your questions to your primary care doctor if you are unable to reach your hospital physician; your PCP works as an extension of your hospital doctor just like your hospital doctor is an extension of your PCP for your time at [address] [facility].     If you experience any of the following symptoms then please call your primary care physician or return to the emergency room if you cannot get hold of your doctor:  Fever, chills, nausea, vomiting, diarrhea, change in mentation, falling, bleeding, shortness of breath    Follow-up Information     Follow up With Specialties Details Why Contact Info    None    None ([phone]) Patient stated that they have no PCP          ________________________________________________________________    Risk of deterioration: Low    Condition at Discharge:  Stable  __________________________________________________________________    Disposition  Home with family, no needs    ____________________________________________________________________    Code Status: Full Code  ___________________________________________________________________      Total time in minutes spent coordinating this discharge (includes going over instructions, follow-up, prescriptions, and preparing report for sign off to her PCP) :  >30 minutes    Signed:  James Doan MD

## 2021-09-10 NOTE — DISCHARGE INSTRUCTIONS
HOSPITALIST DISCHARGE INSTRUCTIONS    NAME: Beena Diaz II   :  1965   MRN:  [id number]     Date/Time:  9/10/2021 8:29 AM    ADMIT DATE: 2021   DISCHARGE DATE: 9/10/2021     Attending Physician: Renzo Benites MD    DISCHARGE DIAGNOSIS:  Acute respiratory failure with hypoxia POA  COVID-19 pneumonia POA  Sepsis POA     MEDICATIONS:  See above    · It is important that you take the medication exactly as they are prescribed. · Keep your medication in the bottles provided by the pharmacist and keep a list of the medication names, dosages, and times to be taken in your wallet. · Do not take other medications without consulting your doctor. Pain Management: per above medications    What to do at [address]:  Cardiac Diet    Recommended activity: Activity as tolerated    If you have questions regarding the hospital related prescriptions or hospital related issues please call [facility] at . You can always direct your questions to your primary care doctor if you are unable to reach your hospital physician; your PCP works as an extension of your hospital doctor just like your hospital doctor is an extension of your PCP for your time at [facility]. If you experience any of the following symptoms then please call your primary care physician or return to the emergency room if you cannot get hold of your doctor:  Fever, chills, nausea, vomiting, diarrhea, change in mentation, falling, bleeding, shortness of breath    Additional Instructions:      Bring these papers with you to your follow up appointments. The papers will help your doctors be sure to continue the care plan from the hospital.              Information obtained by :  I understand that if any problems occur once I am at home I am to contact my physician. I understand and acknowledge receipt of the instructions indicated above. Physician's or R.N.'s Signature                                                                  Date/Time                                                                                                                                              Patient or Representative Signature                                                          Da

## 2021-09-10 NOTE — PROGRESS NOTES
Problem: Falls - Risk of  Goal: *Absence of Falls  Description: Document Yolanda Flores Fall Risk and appropriate interventions in the flowsheet. Outcome: Progressing Towards Goal  Note: Fall Risk Interventions:  Mobility Interventions: Patient to call before getting OOB, PT Consult for mobility concerns         Medication Interventions: Patient to call before getting OOB, Teach patient to arise slowly    Elimination Interventions: Call light in reach, Patient to call for help with toileting needs              Problem: Patient Education: Go to Patient Education Activity  Goal: Patient/Family Education  Outcome: Progressing Towards Goal     Problem: Risk for Spread of Infection  Goal: Prevent transmission of infectious organism to others  Description: Prevent the transmission of infectious organisms to other patients, staff members, and visitors. Outcome: Progressing Towards Goal     Problem: Patient Education:  Go to Education Activity  Goal: Patient/Family Education  Outcome: Progressing Towards Goal     Problem: Airway Clearance - Ineffective  Goal: Achieve or maintain patent airway  Outcome: Progressing Towards Goal     Problem: Gas Exchange - Impaired  Goal: Absence of hypoxia  Outcome: Progressing Towards Goal  Goal: Promote optimal lung function  Outcome: Progressing Towards Goal     Problem: Breathing Pattern - Ineffective  Goal: Ability to achieve and maintain a regular respiratory rate  Outcome: Progressing Towards Goal     Problem:  Body Temperature -  Risk of, Imbalanced  Goal: Ability to maintain a body temperature within defined limits  Outcome: Progressing Towards Goal  Goal: Will regain or maintain usual level of consciousness  Outcome: Progressing Towards Goal  Goal: Complications related to the disease process, condition or treatment will be avoided or minimized  Outcome: Progressing Towards Goal     Problem: Isolation Precautions - Risk of Spread of Infection  Goal: Prevent transmission of infectious organism to others  Outcome: Progressing Towards Goal     Problem: Nutrition Deficits  Goal: Optimize nutrtional status  Outcome: Progressing Towards Goal     Problem: Risk for Fluid Volume Deficit  Goal: Maintain normal heart rhythm  Outcome: Progressing Towards Goal  Goal: Maintain absence of muscle cramping  Outcome: Progressing Towards Goal  Goal: Maintain normal serum potassium, sodium, calcium, phosphorus, and pH  Outcome: Progressing Towards Goal     Problem: Loneliness or Risk for Loneliness  Goal: Demonstrate positive use of time alone when socialization is not possible  Outcome: Progressing Towards Goal     Problem: Fatigue  Goal: Verbalize increase energy and improved vitality  Outcome: Progressing Towards Goal     Problem: Patient Education: Go to Patient Education Activity  Goal: Patient/Family Education  Outcome: Progressing Towards Goal     Problem: Patient Education: Go to Patient Education Activity  Goal: Patient/Family Education  Outcome: Progressing Towards Goal     Problem: Patient Education: Go to Patient Education Activity  Goal: Patient/Family Education  Outcome: Progressing Towards Goal     Problem: Pneumonia: Day 1  Goal: Off Pathway (Use only if patient is Off Pathway)  Outcome: Progressing Towards Goal  Goal: Activity/Safety  Outcome: Progressing Towards Goal  Goal: Consults, if ordered  Outcome: Progressing Towards Goal  Goal: Diagnostic Test/Procedures  Outcome: Progressing Towards Goal  Goal: Nutrition/Diet  Outcome: Progressing Towards Goal  Goal: Medications  Outcome: Progressing Towards Goal  Goal: Respiratory  Outcome: Progressing Towards Goal  Goal: Treatments/Interventions/Procedures  Outcome: Progressing Towards Goal  Goal: Psychosocial  Outcome: Progressing Towards Goal  Goal: *Oxygen saturation within defined limits  Outcome: Progressing Towards Goal  Goal: *Influenza vaccine administered (October-March)  Outcome: Progressing Towards Goal  Goal: *Pneumoccocal vaccine administered  Outcome: Progressing Towards Goal  Goal: *Hemodynamically stable  Outcome: Progressing Towards Goal  Goal: *Demonstrates progressive activity  Outcome: Progressing Towards Goal  Goal: *Tolerating diet  Outcome: Progressing Towards Goal     Problem: Pneumonia: Day 2  Goal: Off Pathway (Use only if patient is Off Pathway)  Outcome: Progressing Towards Goal  Goal: Activity/Safety  Outcome: Progressing Towards Goal  Goal: Consults, if ordered  Outcome: Progressing Towards Goal  Goal: Diagnostic Test/Procedures  Outcome: Progressing Towards Goal  Goal: Nutrition/Diet  Outcome: Progressing Towards Goal  Goal: Discharge Planning  Outcome: Progressing Towards Goal  Goal: Medications  Outcome: Progressing Towards Goal  Goal: Respiratory  Outcome: Progressing Towards Goal  Goal: Treatments/Interventions/Procedures  Outcome: Progressing Towards Goal  Goal: Psychosocial  Outcome: Progressing Towards Goal  Goal: *Oxygen saturation within defined limits  Outcome: Progressing Towards Goal  Goal: *Hemodynamically stable  Outcome: Progressing Towards Goal  Goal: *Demonstrates progressive activity  Outcome: Progressing Towards Goal  Goal: *Tolerating diet  Outcome: Progressing Towards Goal  Goal: *Optimal pain control at patient's stated goal  Outcome: Progressing Towards Goal     Problem: Pneumonia: Day 3  Goal: Off Pathway (Use only if patient is Off Pathway)  Outcome: Progressing Towards Goal  Goal: Activity/Safety  Outcome: Progressing Towards Goal  Goal: Consults, if ordered  Outcome: Progressing Towards Goal  Goal: Diagnostic Test/Procedures  Outcome: Progressing Towards Goal  Goal: Nutrition/Diet  Outcome: Progressing Towards Goal  Goal: Discharge Planning  Outcome: Progressing Towards Goal  Goal: Medications  Outcome: Progressing Towards Goal  Goal: Respiratory  Outcome: Progressing Towards Goal  Goal: Treatments/Interventions/Procedures  Outcome: Progressing Towards Goal  Goal: Psychosocial  Outcome: Progressing Towards Goal  Goal: *Oxygen saturation within defined limits  Outcome: Progressing Towards Goal  Goal: *Hemodynamically stable  Outcome: Progressing Towards Goal  Goal: *Demonstrates progressive activity  Outcome: Progressing Towards Goal  Goal: *Tolerating diet  Outcome: Progressing Towards Goal  Goal: *Describes available resources and support systems  Outcome: Progressing Towards Goal  Goal: *Optimal pain control at patient's stated goal  Outcome: Progressing Towards Goal     Problem: Pneumonia: Day 4  Goal: Off Pathway (Use only if patient is Off Pathway)  Outcome: Progressing Towards Goal  Goal: Activity/Safety  Outcome: Progressing Towards Goal  Goal: Nutrition/Diet  Outcome: Progressing Towards Goal  Goal: Discharge Planning  Outcome: Progressing Towards Goal  Goal: Medications  Outcome: Progressing Towards Goal  Goal: Respiratory  Outcome: Progressing Towards Goal  Goal: Treatments/Interventions/Procedures  Outcome: Progressing Towards Goal  Goal: Psychosocial  Outcome: Progressing Towards Goal     Problem: Pneumonia: Discharge Outcomes  Goal: *Demonstrates progressive activity  Outcome: Progressing Towards Goal  Goal: *Describes follow-up/return visits to physicians  Outcome: Progressing Towards Goal  Goal: *Tolerating diet  Outcome: Progressing Towards Goal  Goal: *Verbalizes name, dosage, time, side effects, and number of days to continue medications  Outcome: Progressing Towards Goal  Goal: *Influenza immunization  Outcome: Progressing Towards Goal  Goal: *Pneumococcal immunization  Outcome: Progressing Towards Goal  Goal: *Respiratory status at baseline  Outcome: Progressing Towards Goal  Goal: *Vital signs within defined limits  Outcome: Progressing Towards Goal  Goal: *Describes available resources and support systems  Outcome: Progressing Towards Goal  Goal: *Optimal pain control at patient's stated goal  Outcome: Progressing Towards Goal

## 2021-09-13 ENCOUNTER — PATIENT OUTREACH (OUTPATIENT)
Dept: CASE MANAGEMENT | Age: 56
End: 2021-09-13

## 2021-09-13 NOTE — PROGRESS NOTES
Care Transitions Initial Call    Call within 2 business days of discharge: Yes     Patient: Deanne Jacob II Patient : 1965 MRN: [id number]    Last Discharge [address]       Complaint Diagnosis Description Type Department Provider    21 Transfer Of Care Acute respiratory failure with hypoxia ([facility] [address] . .. ED to Hosp-Admission (Discharged) (ADMIT) Tomasita Curling, MD; Boone Wallace. .. Was this an external facility discharge? No Discharge Facility: na    Challenges to be reviewed by the provider   Additional needs identified to be addressed with provider: no       Method of communication with provider : none    Discussed COVID-19 related testing which was available at this time. Test results were positive. Patient informed of results, if available? no     Advance Care Planning:   Does patient have an Advance Directive:  currently not on file; patient declined education    Inpatient Readmission Risk score: Unplanned Readmit Risk Score: 12    Was this a readmission? no   Patient stated reason for the admission: SOB    Patients top risk factors for readmission: lack of knowledge about disease and medical condition-COVID   Interventions to address risk factors: Obtained and reviewed discharge summary and/or continuity of care documents    Care Transition Nurse (CTN) contacted the patient by telephone to perform post hospital discharge assessment. Verified name and  with patient as identifiers. Provided introduction to self, and explanation of the CTN role. CTN reviewed discharge instructions, medical action plan and red flags with patient who verbalized understanding. Were discharge instructions available to patient? yes. Reviewed appropriate site of care based on symptoms and resources available to patient including: PCP and Urgent Care Clinics. Patient given an opportunity to ask questions and does not have any further questions or concerns at this time.  The patient agrees to contact the PCP office for questions related to their healthcare. Medication reconciliation was performed with patient, who verbalizes understanding of administration of home medications. Advised obtaining a 90-day supply of all daily and as-needed medications. Referral to Pharm D needed: no     Home Health/Outpatient orders at discharge: none    Durable Medical Equipment ordered at discharge: None    Covid Risk Education    Educated patient about risk for severe COVID-19 due to risk factors according to CDC guidelines. CTN reviewed discharge instructions, medical action plan and red flag symptoms with the patient who verbalized understanding. Discussed COVID vaccination status: unvaccinated. Education provided on COVID-19 vaccination as appropriate. Discussed exposure protocols and quarantine with CDC Guidelines. Patient was given an opportunity to verbalize any questions and concerns and agrees to contact CTN or health care provider for questions related to their healthcare. Was patient discharged with a pulse oximeter? no. Discussed and confirmed pulse oximeter discharge instructions and when to notify provider or seek emergency care. Discussed follow-up appointments. If no appointment was previously scheduled, appointment scheduling offered: no. Is follow up appointment scheduled within 7 days of discharge? no.   [facility] follow up appointment(s): No future appointments. Non-[facility] follow up appointment(s): PCP TBD    Plan for follow-up call in 5-7 days based on severity of symptoms and risk factors. Plan for next call: symptom management-covid s&s and follow up appointment-pcp  CTN provided contact information for future needs. Goals Addressed                 This Visit's Progress     Prevent complications post hospitalization. 9/13/21 Patient reports he is feeling great today and moving around without SOB or cough. Has inhaler if needed but not currently using.  Will schedule with PCP for follow up. Patient will monitor covid s&s and report at next outreach.  PAULINO

## 2021-09-21 NOTE — ADT AUTH CERT NOTES
INPATIENT ADMISSION NOTIF   2021- DISCHARGED ON 09/10/2021    PLEASE FAX BACK REF# AND STATUS FOR THIS CASE TO ME @ [phone]- WE ARE LOOKING FOR AN URGENT CASE UPDATE PLEASE Arabella Estrada YOU SO MUCH!     UR CONTACT   Christine Lopez [phone]      [facility] [address] [place] [address] NPI : [id number]  FACILITY TAX ID : [id number]     [address]  [facility] 2 PROGRESSIVE CARE  94 [facility] [id number] [phone]         PATIENT    Patient Name :Antonio Barroso II   : 1965 (48 yrs)  MRN : [id number]     Patient Mailing Address [address] [place] [address] [] , [phone]                                                             .     [facility] PLAN    Insurance Plan Payor: Sejal Alcantara  Primary Coverage Subscriber ID : [id number]      Current Patient Class : INPATIENT  Admit Date : 2021     REQUESTED LEVEL OF CARE: INPATIENT [101]                                                           Diagnosis : Pneumonia due to COVID-19 virus                          ICD10 Code : Pneumonia due to COVID-19 virus [U07.1, J12.82]       Admitting and Attending Info:  Admitting Provider : Kathleen Manning MD   NPI: [id number]  Admitting Provider Phone. [phone]  Admitting Provider Address:SAME AS FACILITY         Patient Demographics    Patient Name   Tiffany Covarrubias Legal Sex   Male    1965 Address   [address] [address] Phone   [phone] [facility] Account    Name Acct ID Class Status Primary Coverage   Tiffany Covarrubias [id number] INPATIENT [address] [place] [address] OF STATE          Guarantor Account (for Hospital Account [de-identified])    Name Relation to Pt Service Area Active?  Acct Type   [facility] Yes Personal/Family   Address Phone     [address] [facility] [place], [address] [phone](S)            Coverage Information (for Hospital Account [de-identified])    F/O Payor/Plan Subscriber  Subscriber Sex Precert #   BLUE CROSS/VA BLUE CROSS OUT OF STATE 67 M    Subscriber Subscriber #   Sharlene Ramirez [id number]   Grp # Group Name       Address Phone   [address]    Policy Number Status Effective Date Benefits Phone   [id number] -  -   Auth/Cert             Diagnosis     Codes Comments   Acute respiratory failure with hypoxia [facility])  ICD-10-CM: J96.01   ICD-9-CM: 518.81           Admission Information    Arrival Date/Time: 2021 Admit Date/Time: 2021 IP Adm. Date/Time: 2021   Admission Type: Emergency Point of Origin: Non-health Care Facility/self Admit Category:  Other   Means of Arrival: Ambulance Primary Service: Medicine Secondary Service: N/A   Transfer Source:  [facility] C[facility]   Admit Provider: Rachel Dunbar MD Attending Provider: Sujata Jewell MD Referring Provider:    Admission Information    Attending Provider Admission Dx Admitted on   Bessy Daniels MD Pneumonia due to COVID-19 virus 21   Service Isolation Code Status   MEDICINE CONTACT, DROPLET, DROPLET, Droplet Plus Full Code   Allergies Advance Care Planning    No Known Allergies Jump to the Activity     Admission Information    Unit/Bed: [facility] 2 PROGRESSIVE CARE/ Service: MEDICINE   Admitting provider: Rachel Dunbar MD Phone: [phone]   Attending provider: Bessy Daniels MD Phone: [phone]   PCP: None Phone:    Admission dx:  Patient class: I   Admission type: ER     Patient Demographics    Patient Name   Celestina Ceja   [id number] Legal Sex   Male    1965 Address   [address] [address] Phone   [phone] (Home)   [phone] (Mobile)   H&P Notes     H&P by Rachel Dunbar MD at 21 documented on ED to Hosp-Admission (Current) from 2021 in [facility] 2 PROGRESSIVE CARE  Author: Shayy Lopez MD Author Type: Physician Filed: 21   Note Status: Signed Cosign: Cosign Not Required Date of Service: 21   : Shayy Lopez MD (Physician)   Expand AllCollapse All            Hospitalist Admission Note     NAME:            Selvin Noel II   :               1965   MRN:               [id number]      Date of admit: 2021     PCP:    None     Assessment/Plan:      Acute respiratory failure with hypoxia POA due to  COVID-19 pneumonia POA causing  Sepsis POA   , RR 40 , temp 101.6 , lactate 1.2  Presents with 10 days of cough and increasing SOB  Unvaccinated  CXR with     O2 sats on RA were 82%, RR 40 , Dyspnea  Rapid COVID-19 test  Admit to tele or stepdown  O2 to maintain sats > 90%  Currently on L NC o2  IV dexamethasone  IV remdesevir if hospital will approve  Vit C and Zn  Check inflammatory markers and procalcitonin  Empiric IV ceftriaxone/aztihro or levofloxacin  Consider tocilizumab or Baricitinib if CRP > 7.5 and worsening oxygenation  Pulmonary consult in AM as requires high flow O2 or BIPAP     Obesity POA Body mass index is 30.41 kg/m².     Given the patient's current clinical presentation, I have a high level of concern for decompensation if discharged from the emergency department.   My assessment of this patient's clinical condition and my plan of care is as noted above.     DVT prophylaxis with lovenox     Code status: full code  NOK: wife     History      CHIEF COMPLAINT: \"I have been coughing and SOB for 10 days\", now hypoxic     HISTORY OF PRESENT ILLNESS:     27-year-old male     Denies significant past medical history  Does not wear home oxygen  Not received the COVID-19 vaccine     Past 10 days \"having nonstop coughing\"  Cough react reactive  Progressively worsening shortness of breath, worse with walking              Not to the point he is short of breath at rest              Even walking short distance makes him very winded  Generalized weakness and fatigue              Interferes with his ability to do ADLs and yard work  Positive intermittent diarrhea  Poor p.o. intake  Fevers, febrile to 101.6 at [facility]  No nausea vomiting, sore throat, loss of taste or smell     Went to [facility] ER 2021  Febrile to 101.6  Respiratory rate 40  Chest x-ray with patchy bibasilar airspace disease  Rapid Covid positive  Room air sats 82%, now on 15 L O2  Transferred to [facility] for further management        Past medical history:  Pt denies past medical history        Social History           Tobacco Use    Smoking status: Never Smoker    Smokeless tobacco: Never Used   Substance Use Topics    Alcohol use:  Occasional not daily or regular use         Family history:  No children of own  Father  of old age at age 67  Mother was murdered  2 brothers who are healthy     No Known Allergies      Prior to Admission medications        Vit D  Takes no prescription medications     Review of symptoms:                                      POSITIVE= Bold  Negative = not bold  General:                     fever, chills, sweats, generalized weakness, weight loss                                      loss of appetite   Eyes:                           blurred vision, eye pain, double vision  ENT:                            Coryza, sore throat, trouble swallowing  Respiratory:               cough, sputum, SOB  Cardiology:                chest pain, orthopnea, PND, edema  Gastrointestinal:       abdominal pain , N/V, diarrhea, constipation, melena or BRBPR  Genitourinary:           Urgency, dysuria, hematuria  Muskuloskeletal :      Joint redness, swelling or acute joint pain, myalgias  Hematology:              easy bruising, nose or gum bleeding  Dermatological:         rash, ulceration  Endocrine:                 Polyuria or polydipsia, heat or hold intolerance  Neurological:             Headache, focal motor or sensory changes                                      Speech difficulties, memory loss  Psychological:          depression, agitation        Objective:   VITALS:    Patient Vitals for the past 24 hrs:    Temp Pulse Resp BP SpO2   21 1715  94 28 128/80 94 %   21 1702     91 %   21 1701    136/81    21 1657 99.6 °F (37.6 °C) 93 22 136/81 92 %      Temp (24hrs), Av.8 °F (37.7 °C), Min:99.1 °F (37.3 °C), Max:100.4 °F (38 °C)     O2 Flow Rate (L/min): 8 l/min O2 Device: Hi flow nasal cannula     Wt Readings from Last 12 Encounters:   21 90.7 kg (200 lb)   21 90.7 kg (200 lb)            PHYSICAL EXAM:      I had a face to face encounter and independently examined this patient on 21  as outlined below:     General:          Alert, cooperative in no distress     HEENT:           Normocephalic, atraumatic    PERRL, EOMI  Sclera no icterus                          Nasal mucosa without masses or discharge  Hearing intact to voice                          Oropharynx without erythema or exudate  No thrush  [place] MM  Neck:               No meningismus, trachea midline, no carotid bruits                           Thyroid not enlarged, no nodules or tenderness  Lungs:             Decreased BS bilaterally. No wheezing or rales                          No accessory muscle use or retractions. Heart:              Regular rate and rhythm,  no murmur or gallop. No LE edema  Abdomen:        Soft, non-tender. Not distended. Bowel sounds normal.                           No masses, No Hepatosplenomegaly, No Rebound or guarding  Lymph nodes: No cervical or inguinal MICHAEL  Musculoskeletal:  No Joint swelling, erythema, warmth.  No Cyanosis or clubbing  Skin:                 No rashes                           Not Jaundiced   No nodules or thickening  Neurologic:      Alert and oriented X 3, follows commands                           Cranial nerves 2 to 12 intact                          Symmetric motor strength bilaterally                LAB DATA REVIEWED:    Recent Results         Recent Results (from the past 24 hour(s))   URINALYSIS W/ RFLX MICROSCOPIC     Collection Time: 08/31/21  7:01 AM   Result Value Ref Range     Color YELLOW/STRAW       Appearance CLEAR CLEAR       Specific gravity 1.010 1.003 - 1.030       pH (UA) 6.0 5.0 - 8.0       Protein Negative NEG mg/dL     Glucose Negative NEG mg/dL     Ketone Negative NEG mg/dL     Bilirubin Negative NEG       Blood Negative NEG       Urobilinogen 1.0 0.2 - 1.0 EU/dL     Nitrites Negative NEG       Leukocyte Esterase Negative NEG     BLOOD GAS, ARTERIAL     Collection Time: 08/31/21  5:36 PM   Result Value Ref Range     pH 7.46 (H) 7.35 - 7.45       PCO2 39 35 - 45 mmHg     PO2 77 (L) 80 - 100 mmHg     O2 SAT 96 92 - 97 %     BICARBONATE 27 (H) 22 - 26 mmol/L     BASE EXCESS 3.4 mmol/L     O2 METHOD NONREBREATHER MASK       FIO2 100 %     Sample source ARTERIAL       SITE LEFT RADIAL       HANY'S TEST YES              EKG as read by me shows NSR rate 100, normal axis, no LVH  Normal intervals, no ST-T changes     CXR read by radiology and reviewed by myself results:  Cardiomediastinal silhouette is within normal limits. The pulmonary  vasculature is normal. There is patchy bilateral airspace disease with a basilar  predominance without pneumothorax or effusion  IMPRESSION  1. Patchy bilateral airspace disease     I saw the patient personally, took a history and did a complete physical exam at the bedside. I performed complex decision making in coming up with a diagnostic and treatment plan for the patient. I reviewed the patient's past medical records, current laboratory and radiology results, and actual Xray films/EKG.  I have also discussed this case with the involved ED physician.  [address] [place] [address] discussed with:    Patient, Family, ED Doc     Risk of deterioration:  High     Total Time Coordinating Admission:   65  minutes    Total Critical Care Time:           Steve Gamez MD      Patient Demographics    Patient Name   Kevin Brandon   [id number] Legal Sex   Male    1965 Address   [address] [address] Phone   [phone] (Home)   [phone] (Mobile)   CSN:   [id number]   [facility] Date: Admit Time Room Bed   Aug 31, 2021  4:48 PM 2243 [[id number]] 01 [[id number]]   Attending Providers    Provider Pager From To   Kvng Rodríguez MD  21   Ramy Ureña MD  21   Josephine Vásquez MD  21    Emergency Contact(s)    Name Relation Home Work [address] [place] [address]   Andriy Wooten [place] Spouse [phone]   Utilization Reviews       MD Progress Note-21 by Toby Bar       Review Entered Review Status   2021 15:21 In Primary      Criteria Review   Hospitalist Progress Note     NAME:            Josh Stewart  UWS:                  MRN:               [id number]         Assessment / Plan:  Acute respiratory failure with hypoxia POA due to  COVID-19 pneumonia POA causing  Sepsis POA   , RR 40 , temp 101.6 , lactate 1.2     Presents with 10 days of cough and increasing SOB  Unvaccinated  O2 sats on RA were 82%, RR 40 , Dyspnea  Admit to tele or stepdown  O2 to maintain sats > 90%  Currently on L NC o2  C/w IV dexamethasone  Didn't qualify for Remdesivir per hospital protocol as her symptoms began 10 days back  Check inflammatory markers and procalcitonin  Started on Emperic Abx  Monitor CRP, if >7.5, and worsening oxygen requirement, would qualify for Tocilizumab  Pulmonary following     Obesity POA Body mass index is 30.41 kg/m².     DVT prophylaxis with lovenox     Code status: full code  NOK: wife     Estimated discharge date:   Barriers: On 6 liters oxygen      Subjective:      Chief Complaint / Reason for Physician Visit  Follow up for covid 19  He is decreased to 6 liters oxygen this AM  Looks anxious     Review of Systems:                               Symptom Y/N Comments   Symptom Y/N Comments    Fever/Chills n     Chest Pain         Poor Appetite       Edema         Cough y     Abdominal Pain         Sputum y     Joint Pain         SOB/VIRK y     Pruritis/Rash         Nausea/vomit       Tolerating PT/OT         Diarrhea       Tolerating Diet         Constipation       Other            Could NOT obtain due to:        Objective:      VITALS:   Last 24hrs VS reviewed since prior progress note.  Most recent are:  Patient Vitals for the past 24 hrs:    Temp Pulse Resp BP SpO2   09/01/21 1200  73      09/01/21 1043   (!) 34  91 %   09/01/21 1042   (!) 36  (!) 82 %   09/01/21 1038   30  (!) 89 %   09/01/21 1031   27 (!) 150/92 94 %   09/01/21 1029  79 (!) 39 137/82 92 %   09/01/21 1023 98.7 °F (37.1 °C) 79 25 134/76 94 %   09/01/21 1021  75 29 134/76 93 %   09/01/21 0746  75      09/01/21 0722 98 °F (36.7 °C) 78 28 125/76 94 %   09/01/21 0400  78      09/01/21 0219 98.2 °F (36.8 °C) 77 30 122/79 94 %   09/01/21 0155  73 27  96 %   09/01/21 0100  78 (!) 31 (!) 105/27 97 %   09/01/21 0038 98.8 °F (37.1 °C) 73 28 (!) 143/69 94 %   09/01/21 0037     94 %   09/01/21 0000  78      08/31/21 2345  78 (!) 38 108/84 94 %   08/31/21 2330  76 27 118/76 95 %   08/31/21 2315  77 24 120/82 94 %   08/31/21 2300  80 29 127/80 95 %   08/31/21 2245  77 26 128/79 95 %   08/31/21 2230  79 (!) 33 115/78 94 %   08/31/21 2215  78 26 118/73 92 %   08/31/21 2200  77 28 119/70 99 %   08/31/21 2145  79 21 122/73 98 %   08/31/21 2130  83 (!) 31 124/78 95 %   08/31/21 2115  92 (!) 35 135/79 95 %   08/31/21 2100  85 29 121/87 90 %   08/31/21 2045  88 29 138/78    08/31/21 2000  78      08/31/21 1715  94 28 128/80 94 %   08/31/21 1702     91 %   08/31/21 1701    136/81    08/31/21 1657 99.6 °F (37.6 °C) 93 22 136/81 92 %       Intake/Output Summary (Last 24 hours) at 9/1/2021 1420  Last data filed at 9/1/2021 1023        Gross per 24 hour   Intake    Output 810 ml   Net -810 ml         I had a face to face encounter and independently examined this patient on 9/1/2021, as outlined below:  PHYSICAL EXAM:  General:          WD, WN. Alert, cooperative, no acute distress    EENT:              EOMI. Anicteric sclerae.  MMM  Resp:               CTA bilaterally, no wheezing or rales.  No accessory muscle use  CV:                  Regular  rhythm,  No edema  GI:                   Soft, Non distended, Non tender.  +Bowel sounds  Neurologic:       Alert and oriented X 3, normal speech,   Psych:   Good insight. Not anxious nor agitated  Skin:                No rashes.  No jaundice     Reviewed most current lab test results and cultures  YES  Reviewed most current radiology test results   YES  Review and summation of old records today    NO  Reviewed patient's current orders and MAR    YES  PMH/SH reviewed - no change compared to H&P  ________________________________________________________________________  Care Plan discussed with:      Comments   Patient y     Family        RN y     Care Manager       Consultant                           Multidiciplinary team rounds were held today with , nursing, pharmacist and clinical coordinator.  Patient's plan of care was discussed; medications were reviewed and discharge planning was addressed.     ________________________________________________________________________  Total NON critical care TIME: 35   Minutes     Total CRITICAL CARE TIME Spent:   Minutes non procedure based         Comments   >50% of visit spent in counseling and coordination of care       ________________________________________________________________________  Lenda Ape, MD      Procedures: see electronic medical records for all procedures/Xrays and details which were not copied into this note but were reviewed prior to creation of Plan.       LABS:  I reviewed today's most current labs and imaging studies.   Pertinent labs include:          Recent Labs     09/01/21 0217 08/30/21 2016   WBC 4.3 4.7   HGB 14.7 15.5   HCT 45.1 46.4    190                Recent Labs     09/01/21 0217 08/30/21 2047 08/30/21 2016   *  --  134*   K 4.5  --  4.0     --  94*   CO2 30  --  32   *  --  104*   BUN 15  --  17   CREA 0.95  --  1.26   CA 8.3*  --  8.5   ALB 2.5*  --  3.3*   TBILI 0.6  --  0.8   ALT 47  --  36   INR  --  1.0  --          Signed: Kevin Shea MD      ADDITIONAL CLINICAL FROM 09/01/2021    MD Progress Note-9/1/21 by Josep Gain       Review Entered Review Status   9/1/2021 15:21 In Primary      Criteria Review   Hospitalist Progress Note     NAME:            Josh Amado  HGO:               [birth year]   CVA:               [id number]         Assessment / Plan:  Acute respiratory failure with hypoxia POA due to  COVID-19 pneumonia POA causing  Sepsis POA   , RR 40 , temp 101.6 , lactate 1.2     Presents with 10 days of cough and increasing SOB  Unvaccinated  O2 sats on RA were 82%, RR 40 , Dyspnea  Admit to tele or stepdown  O2 to maintain sats > 90%  Currently on L NC o2  C/w IV dexamethasone  Didn't qualify for Remdesivir per hospital protocol as her symptoms began 10 days back  Check inflammatory markers and procalcitonin  Started on Emperic Abx  Monitor CRP, if >7.5, and worsening oxygen requirement, would qualify for Tocilizumab  Pulmonary following     Obesity POA Body mass index is 30.41 kg/m².     DVT prophylaxis with lovenox     Code status: full code  NOK: wife     Estimated discharge date: 9/4  Barriers: On 6 liters oxygen      Subjective:      Chief Complaint / Reason for Physician Visit  Follow up for covid 19  He is decreased to 6 liters oxygen this AM  Looks anxious     Review of Systems:                               Symptom Y/N Comments   Symptom Y/N Comments    Fever/Chills n     Chest Pain         Poor Appetite       Edema         Cough y     Abdominal Pain         Sputum y     Joint Pain         SOB/VIRK y     Pruritis/Rash         Nausea/vomit       Tolerating PT/OT         Diarrhea       Tolerating Diet         Constipation       Other            Could NOT obtain due to:        Objective:      VITALS:   Last 24hrs VS reviewed since prior progress note.  Most recent are:  Patient Vitals for the past 24 hrs:    Temp Pulse Resp BP SpO2   09/01/21 1200  73      09/01/21 1043   (!) 34  91 %   09/01/21 1042   (!) 36  (!) 82 %   09/01/21 1038   30  (!) 89 %   09/01/21 1031   27 (!) 150/92 94 %   09/01/21 1029  79 (!) 39 137/82 92 %   09/01/21 1023 98.7 °F (37.1 °C) 79 25 134/76 94 %   09/01/21 1021  75 29 134/76 93 %   09/01/21 0746  75      09/01/21 0722 98 °F (36.7 °C) 78 28 125/76 94 %   09/01/21 0400  78      09/01/21 0219 98.2 °F (36.8 °C) 77 30 122/79 94 %   09/01/21 0155  73 27  96 %   09/01/21 0100  78 (!) 31 (!) 105/27 97 %   09/01/21 0038 98.8 °F (37.1 °C) 73 28 (!) 143/69 94 %   09/01/21 0037     94 %   09/01/21 0000  78      08/31/21 2345  78 (!) 38 108/84 94 %   08/31/21 2330  76 27 118/76 95 %   08/31/21 2315  77 24 120/82 94 %   08/31/21 2300  80 29 127/80 95 %   08/31/21 2245  77 26 128/79 95 %   08/31/21 2230  79 (!) 33 115/78 94 %   08/31/21 2215  78 26 118/73 92 %   08/31/21 2200  77 28 119/70 99 %   08/31/21 2145  79 21 122/73 98 %   08/31/21 2130  83 (!) 31 124/78 95 %   08/31/21 2115  92 (!) 35 135/79 95 %   08/31/21 2100  85 29 121/87 90 %   08/31/21 2045  88 29 138/78    08/31/21 2000  78      08/31/21 1715  94 28 128/80 94 %   08/31/21 1702     91 %   08/31/21 1701    136/81    08/31/21 1657 99.6 °F (37.6 °C) 93 22 136/81 92 %         Intake/Output Summary (Last 24 hours) at 9/1/2021 1420  Last data filed at 9/1/2021 1023        Gross per 24 hour   Intake    Output 810 ml Net -810 ml         I had a face to face encounter and independently examined this patient on 9/1/2021, as outlined below:  PHYSICAL EXAM:  General:          WD, WN. Alert, cooperative, no acute distress    EENT:              EOMI. Anicteric sclerae. MMM  Resp:               CTA bilaterally, no wheezing or rales.  No accessory muscle use  CV:                  Regular  rhythm,  No edema  GI:                   Soft, Non distended, Non tender.  +Bowel sounds  Neurologic:       Alert and oriented X 3, normal speech,   Psych:   Good insight. Not anxious nor agitated  Skin:                No rashes.  No jaundice     Reviewed most current lab test results and cultures  YES  Reviewed most current radiology test results   YES  Review and summation of old records today    NO  Reviewed patient's current orders and MAR    YES  PMH/SH reviewed - no change compared to H&P  ________________________________________________________________________  Care Plan discussed with:      Comments   Patient y     Family        RN y     Care Manager       Consultant                           Multidiciplinary team rounds were held today with , nursing, pharmacist and clinical coordinator.  Patient's plan of care was discussed; medications were reviewed and discharge planning was addressed.     ________________________________________________________________________  Total NON critical care TIME: 35   Minutes     Total CRITICAL CARE TIME Spent:   Minutes non procedure based         Comments   >50% of visit spent in counseling and coordination of care       ________________________________________________________________________  Beba Reed MD      Procedures: see electronic medical records for all procedures/Xrays and details which were not copied into this note but were reviewed prior to creation of Plan.       LABS:  I reviewed today's most current labs and imaging studies.   Pertinent labs include:          Recent Labs     09/01/21 0217 08/30/21 2016   WBC 4.3 4.7   HGB 14.7 15.5   HCT 45.1 46.4    190                Recent Labs     09/01/21 0217 08/30/21 2047 08/30/21 2016   *  --  134*   K 4.5  --  4.0     --  94*   CO2 30  --  32   *  --  104*   BUN 15  --  17   CREA 0.95  --  1.26   CA 8.3*  --  8.5   ALB 2.5*  --  3.3*   TBILI 0.6  --  0.8   ALT 47  --  36   INR  --  1.0  --          Signed: Kevin Shea MD         Viral Illness, Acute - Care Day 2 (9/1/2021) by Margarita Ponce RN       Review Entered Review Status   9/1/2021 14:07 Completed      Criteria Review      Care Day: 2 Care Date: 9/1/2021 Level of Care: Step Down    Guideline Day 2    Level Of Care    (X) Floor    9/1/2021 14:07:57 EDT by Allegra Opitz      ip telemetry    Clinical Status    (X) * Hypotension absent    9/1/2021 14:07:57 EDT by Natty Cleveland      bp 150/92    (X) * No requirement for mechanical ventilation    9/1/2021 14:07:57 EDT by Natty Cleveland      n/a    ( ) * Oxygenation at baseline or improved    9/1/2021 14:07:57 EDT by Natty Cleveland      82-91% 6LPM HI FLOW NC    (X) * Mental status at baseline    9/1/2021 14:07:57 EDT by Natty Cleveland      alert,oriented    Activity    (X) Advance activity as tolerated    9/1/2021 14:07:57 EDT by Allegra Opitz      activity as tolerated w/ assistance    Routes    (X) * Oral hydration    9/1/2021 14:07:57 EDT by Allegra Opitz      regular diet low fat, low chol, high fiber, KATIE    (X) Oral or IV medications    (X) Usual diet    9/1/2021 14:07:57 EDT by Allegra Opitz      regular diet low fat, low chol, high fiber, KATIE    Interventions    (X) Possible isolation    9/1/2021 14:07:57 EDT by Natty Cleveland      droplet plus isolation    (X) Pulse oximetry    9/1/2021 14:07:57 EDT by Allegra Opitz      spot check    ( ) Possible oxygen    9/1/2021 14:07:57 EDT by Allegra Opitz      resp 39 82-91% 6LPM HI FLOW NC    Medications    (X) Possible antibiotic (eg, for bacterial coinfection or superinfection)    9/1/2021 14:07:57 EDT by Gino Valladares      Zithromax 500mg iv qday  Rocephin 1g iv qday    * Milestone   Additional Notes   9/1/2021   IP TELEMETRY   VS  98 79 150/92 39 82-91% 6LPM HI FLOW NC   LABS     D-dimer: 0.86 (H)   Sodium: 135 (L)   Potassium: 4.5   Chloride: 102   CO2: 30   Anion gap: 3 (L)   Glucose: 129 (H)   BUN: 15   Creatinine: 0.95   BUN/Creatinine ratio: 16   Calcium: 8.3 (L)   GFR est non-AA: >60   GFR est AA: >60   Bilirubin, total: 0.6   Protein, total: 7.2   Albumin: 2.5 (L)   Globulin: 4.7 (H)   A-G Ratio: 0.5 (L)   ALT: 47   AST: 70 (H)   Alk. phosphatase: 52   C-Reactive protein: 8.64 (H)      MEDS   Decadron 6mg iv qday   Vitamin c 500mg po bid   Zinc 50mg po q12      PULMONARY DISEASE NOTE   IMPRESSION:   · Acute hypoxic respiratory failure   · COVID-19 pneumonia, unvaccinated   · COVID associated gastroenteritis       RECOMMENDATIONS:   · O2 - wean as tolerated   · Dexamethasone   · Outside of window for remdesivir per hospital system criteria   · CRP is elevated but would hold off on administration of tocilizumab since his O2 requirements have improved.  If he worsens, certainly should be reconsidered   · DVT prophylaxis       Subjective:       This patient has been seen and evaluated at the request of Dr. Erica Carroll for [facility]. Patient is a 48 y.o. unvaccinated male admitted overnight with COVID pneumonia. He has had 10 days of coughing and progressive dyspnea.  Some diarrhea as well.  Found to be hypoxic on admission.  Since admission he has been weaned from 15 LPM down to 6 LPM.      Physical Exam:    General: Alert, ill appearing, no distress   Head: Normocephalic, without obvious abnormality, atraumatic. Eyes: Conjunctivae/corneas clear.     Nose: Nares normal. Septum midline.  Mucosa normal.    Throat: Lips, mucosa, and tongue normal.     Neck: Supple, symmetrical, trachea midline    Back:   Symmetric, no curvature. ROM normal.   Lungs:   Distant bilateral breath sounds   Chest wall: No tenderness or deformity. Heart: Regular rate and rhythm    Abdomen:   Soft, non-tender. Bowel sounds normal.    Extremities: Extremities normal, atraumatic, no cyanosis    Skin: Skin color, texture, turgor normal. No rashes or lesions   Lymph nodes: Cervical, supraclavicular nodes normal.   Neurologic: Grossly nonfocal               Viral Illness, Acute - Care Day 1 (8/31/2021) by Pati Velasquez Entered Review Status   9/1/2021 09:29 Completed      Criteria Review      Care Day: 1 Care Date: 8/31/2021 Level of Care: Step Down    Guideline Day 1    Level Of Care    (X) ICU or floor    9/1/2021 09:29:07 EDT by Margart Snellen Stepdown    Clinical Status    (X) * Clinical Indications met    9/1/2021 09:29:07 EDT by [employer] old unvaccinated male w/no sig PMH to ED c/o cough x10 days that will not stop. Noted to be hypoxic in ED w/RA sat 82% and req HFNC. Rapid COVID 19+. 90.7kg. HR 80, 115/78    ( ) Possible Fever    9/1/2021 09:29:07 EDT by Margart Snellen      Tmax 99.6    (X) Possible Tachypnea    9/1/2021 09:29:07 EDT by Margart Snellen      RR sustained 24-38    (X) Possible Hypoxemia    9/1/2021 09:29:07 EDT by Margart Snellen      RA sat 82%-req O2 at 6liters inc to 8liters and then HFNC at 15liters with sats 92%    Routes    (X) Liquid or usual diet    9/1/2021 09:29:07 EDT by Margart Snellen      Reg diet-low fat/low chol/high fiber/KATIE    Interventions    (X) Possible isolation    9/1/2021 09:29:07 EDT by Margart Snellen      droplet plus isolation    (X) CBC with differential, chemistries, renal and hepatic function testing, C-reactive protein, coagulation panel    9/1/2021 09:29:07 EDT by Margart Snellen      d dimer 0.96. CRP 4.99. Ferritin 707, . Na 134, gluc 104, alb 3.3, glob 4.6, ast 63. bld cx x2.  CBC w/diff and UA wnl.12 lead EKG: NSR, rate 100    (X) PCR for viral pathogens    9/1/2021 09:29:07 EDT by Zev De Anda      rapid covid 19 POSITIVE    (X) ABG or oximetry    9/1/2021 09:29:07 EDT by Zev De Anda      ABG on NRBM at FIO2: 7.46/39/77/27/96.  Cont pulse ox    (X) Possible oxygen    9/1/2021 09:29:07 EDT by Zev De Anda      HFNC at 15 liters    (X) Possible chest x-ray    9/1/2021 09:29:07 EDT by Marla Aguirre cxr: patchy bilateral airspace diseaes    * Milestone   Additional Notes   Assessment/Plan:    Acute respiratory failure with hypoxia POA due to   COVID-19 pneumonia POA causing   Sepsis POA   , RR 40 , temp 101.6 , lactate 1.2   Presents with 10 days of cough and increasing SOB   Unvaccinated   CXR with      O2 sats on RA were 82%, RR 40 , Dyspnea   Rapid COVID-19 test   Admit to tele or stepdown   O2 to maintain sats > 90%   Currently on L NC o2   IV dexamethasone   IV remdesevir if hospital will approve   Vit C and Zn   Check inflammatory markers and procalcitonin   Empiric IV ceftriaxone/aztihro or levofloxacin   Consider tocilizumab or Baricitinib if CRP > 7.5 and worsening oxygenation   Pulmonary consult in AM as requires high flow O2 or BIPAP       Obesity POA Body mass index is 30.41 kg/m².       Given the patient's current clinical presentation, I have a high level of concern for decompensation if discharged from the emergency department.    My assessment of this patient's clinical condition and my plan of care is as noted above.       DVT prophylaxis with lovenox      PHYSICAL EXAM:    General:          Alert, cooperative in no distress      HEENT:           Normocephalic, atraumatic    PERRL, EOMI  Sclera no icterus                           Nasal mucosa without masses or discharge  Hearing intact to voice                           Oropharynx without erythema or exudate  No thrush  [place] MM   Neck:               No meningismus, trachea midline, no carotid bruits                            Thyroid not enlarged, no nodules or tenderness   Lungs:             Decreased BS bilaterally. No wheezing or rales                           No accessory muscle use or retractions. Heart:              Regular rate and rhythm,  no murmur or gallop.                           No LE edema   Abdomen:        Soft, non-tender. Not distended.  Bowel sounds normal.                            No masses, No Hepatosplenomegaly, No Rebound or guarding   Lymph nodes: No cervical or inguinal MICHAEL   Musculoskeletal:  No Joint swelling, erythema, warmth. No Cyanosis or clubbing   Skin:                 No rashes                            Not Jaundiced   No nodules or thickening   Neurologic:      Alert and oriented X 3, follows commands                            Cranial nerves 2 to 12 intact                           Symmetric motor strength bilaterally      Viral Illness, Acute - Clinical Indications for Admission to Inpatient Care by Suleman Bunch       Review Entered Review Status   9/1/2021 09:07 Completed      Criteria Review      Clinical Indications for Admission to Inpatient Care    Most Recent : Aneesh Mcgraw Most Recent Date: 9/1/2021 09:07:16 EDT    (X) Admission is indicated for  1 or more  of the following  [A] [B] (1) (2) (3) (4) (5) (6) (7)    (8) (9):       (X) Pulmonary manifestation, as indicated by  1 or more  of the following :          (X) Hypoxemia          9/1/2021 09:07:16 EDT by Aneesh Mcgraw            RA sat 82% in ED with labored respirations. Placed on 6liters and then increased to NRBM at 8liters and HFNC at 15 liters          (X) Severe tachypnea (eg, respiratory rate greater than 24 breaths per minute [C]) (13)          9/1/2021 09:07:16 EDT by Aneesh Mcgraw            RR 24-38    Notes:    9/1/2021 09:07:16 EDT by Aneesh Mcgraw    Subject: Additional Clinical Information      * Pt is 55yr old male with no sig PMH that is unvaccinated to ED c/o nonstop coughing.  Rapid covid positive in outside ED prior to transfer and hypoxia with RA sat down to 82% now req HFNC at 15 liters.  Admitted

## 2021-10-13 ENCOUNTER — PATIENT OUTREACH (OUTPATIENT)
Dept: CASE MANAGEMENT | Age: 56
End: 2021-10-13

## 2021-10-13 NOTE — PROGRESS NOTES
Patient has graduated from the Transitions of Care Coordination  program on 10/13/21. Patient/family has the ability to self-manage at this time Care management goals have been completed. Patient was not referred to the [facility] team for further management. Goals Addressed                 This Visit's Progress     COMPLETED: Prevent complications post hospitalization. 9/13/21 Patient reports he is feeling great today and moving around without SOB or cough. Has inhaler if needed but not currently using. Will schedule with PCP for follow up. Patient will monitor covid s&s and report at next outreach. PAULINO    10/13/21 Per review of chart patient has had no ED or hospital admissions 30 days post discharge. Goal complete. [facility]             Patient has Care Transition Nurse's contact information for any further questions, concerns, or needs. Patients upcoming visits:  No future appointments.

## 2021-11-01 PROCEDURE — 74011636637 HC RX REV CODE- 636/637: Performed by: STUDENT IN AN ORGANIZED HEALTH CARE EDUCATION/TRAINING PROGRAM
